# Patient Record
Sex: FEMALE | Race: WHITE | NOT HISPANIC OR LATINO
[De-identification: names, ages, dates, MRNs, and addresses within clinical notes are randomized per-mention and may not be internally consistent; named-entity substitution may affect disease eponyms.]

---

## 2023-05-17 PROBLEM — Z00.00 ENCOUNTER FOR PREVENTIVE HEALTH EXAMINATION: Status: ACTIVE | Noted: 2023-05-17

## 2023-06-08 ENCOUNTER — FORM ENCOUNTER (OUTPATIENT)
Age: 73
End: 2023-06-08

## 2023-06-14 ENCOUNTER — FORM ENCOUNTER (OUTPATIENT)
Age: 73
End: 2023-06-14

## 2023-06-14 ENCOUNTER — APPOINTMENT (OUTPATIENT)
Dept: ORTHOPEDIC SURGERY | Facility: CLINIC | Age: 73
End: 2023-06-14
Payer: MEDICARE

## 2023-06-14 DIAGNOSIS — Z86.79 PERSONAL HISTORY OF OTHER DISEASES OF THE CIRCULATORY SYSTEM: ICD-10-CM

## 2023-06-14 DIAGNOSIS — Z78.9 OTHER SPECIFIED HEALTH STATUS: ICD-10-CM

## 2023-06-14 DIAGNOSIS — I10 ESSENTIAL (PRIMARY) HYPERTENSION: ICD-10-CM

## 2023-06-14 PROCEDURE — 99204 OFFICE O/P NEW MOD 45 MIN: CPT

## 2023-06-14 RX ORDER — ONDANSETRON 4 MG/1
4 TABLET ORAL
Refills: 0 | Status: ACTIVE | COMMUNITY

## 2023-06-14 RX ORDER — ATORVASTATIN CALCIUM 20 MG/1
20 TABLET, FILM COATED ORAL
Refills: 0 | Status: ACTIVE | COMMUNITY

## 2023-06-14 RX ORDER — OLMESARTAN MEDOXOMIL 40 MG/1
40 TABLET, FILM COATED ORAL
Refills: 0 | Status: ACTIVE | COMMUNITY

## 2023-06-14 RX ORDER — PANTOPRAZOLE SODIUM 40 MG/1
40 GRANULE, DELAYED RELEASE ORAL
Refills: 0 | Status: ACTIVE | COMMUNITY

## 2023-06-14 RX ORDER — HYOSCYAMINE SULFATE 0.12 MG/1
0.12 TABLET SUBLINGUAL
Refills: 0 | Status: ACTIVE | COMMUNITY

## 2023-06-14 RX ORDER — MONTELUKAST 10 MG/1
TABLET, FILM COATED ORAL
Refills: 0 | Status: ACTIVE | COMMUNITY

## 2023-06-14 RX ORDER — APIXABAN 5 MG/1
5 TABLET, FILM COATED ORAL
Refills: 0 | Status: ACTIVE | COMMUNITY

## 2023-06-14 NOTE — DISCUSSION/SUMMARY
[de-identified] : Options were discussed today including oral anti-inflammatories, Physical Therapy, Steroid Injection, Hyalgan injections or Surgery. The patient had time to ask questions of the different treatment options, including doing nothing and just observing for a finite period of time and re-evaluating in the future.\par \par Patient can d/c the b/l bracing. She has significant poly wear to the Left knee and may need poly exchange. \par \par Patient was advised to get the OP Note from left TKA and f/u after this.

## 2023-06-14 NOTE — PHYSICAL EXAM
[de-identified] : B/L ankle aircasts in place> Aircast  LLE was removed as well as the KI.\par \par Left knee: Positive mild effusion. There is quad atrophy noted LLE: Scar noted Left knee without any signs of infection.  Patient is able to SLR against gravity. quad tendon intact.  Positive joint line TTP. ROM 0-95 with pain on extreme flexion. calf soft NT \par No laxity noted. no significant drawer

## 2023-06-14 NOTE — HISTORY OF PRESENT ILLNESS
[de-identified] : Patient is here today for her left knee.  Patient fell on her side 6 days ago and injured b/l knees and b/l ankles.  She had Left TKA by Dr Parsons approx 10-15 yrs ago.  She was seen at [facility] by Ortho and Quad tendon rupture ruled out.  She states she was placed in KI.  She has been WBAT b/l LE with LLE KI She is scheduled to see Dr Cline for b/l ankles

## 2023-06-15 ENCOUNTER — FORM ENCOUNTER (OUTPATIENT)
Age: 73
End: 2023-06-15

## 2023-06-18 ENCOUNTER — FORM ENCOUNTER (OUTPATIENT)
Age: 73
End: 2023-06-18

## 2023-06-20 ENCOUNTER — APPOINTMENT (OUTPATIENT)
Dept: ORTHOPEDIC SURGERY | Facility: CLINIC | Age: 73
End: 2023-06-20
Payer: MEDICARE

## 2023-06-20 DIAGNOSIS — S93.491A SPRAIN OF OTHER LIGAMENT OF RIGHT ANKLE, INITIAL ENCOUNTER: ICD-10-CM

## 2023-06-20 DIAGNOSIS — S93.601A UNSPECIFIED SPRAIN OF RIGHT FOOT, INITIAL ENCOUNTER: ICD-10-CM

## 2023-06-20 DIAGNOSIS — S82.61XA DISPLACED FRACTURE OF LATERAL MALLEOLUS OF RIGHT FIBULA, INITIAL ENCOUNTER FOR CLOSED FRACTURE: ICD-10-CM

## 2023-06-20 PROCEDURE — 99214 OFFICE O/P EST MOD 30 MIN: CPT

## 2023-06-20 NOTE — DATA REVIEWED
[CT Scan] : CT scan [Right] : of the right [Ankle] : ankle [I independently reviewed and interpreted images and report] : I independently reviewed and interpreted images and report [FreeTextEntry1] : IMPRESSION:\par \par Acute avulsion fracture at the anterior process of the calcaneus.\par \par Acute avulsion fracture along the dorsal lateral distal aspect of the cuboid.\par \par Acute avulsion fracture at the tip of the lateral malleolus.\par \par Ill-defined hematoma along the extensor digitorum brevis muscle.\par \par Chronic erosions seen at the metatarsal bases, cuneiforms, and medial aspect\par of the first metatarsal head. Correlate for underlying history of gout.

## 2023-06-20 NOTE — PHYSICAL EXAM
[de-identified] : Examination of the right foot and ankle is as follows:\par Inspection: swelling, ecchymosis of foot and ankle, moderate swelling of dorsal foot and lateral ankle, but no abrasion, laceration, no erythema\par Palpation: lateral malleolus tenderness, lateral ligament tenderness, tenderness to palpation over cuboid\par ROM: plantarflexion 20 degrees, inversion 15 degrees, eversion 10 degrees, dorsiflexion 10 degrees\par Strength: dorsiflexion 4/5, plantar flexion 4/5, inversion 4/5, eversion 4/5, EHL 5/5, FHL 5/5\par Vascular: dorsalis pedis pulse: 1+, posterior tibialis pulse: 1+\par Neuro: Sensation present to light touch in all distributions\par Gait: antalgic, ambulation with RW, RLE air cast\par \par X-rays of the right ankle is as follows: outside x-rays reviewed from [facility]\par Ankle 3 view: closed, nondisplaced, lateral malleolar avulsion.

## 2023-06-20 NOTE — HISTORY OF PRESENT ILLNESS
[Sudden] : sudden [Dull/Aching] : dull/aching [Throbbing] : throbbing [Intermittent] : intermittent [Household chores] : household chores [Leisure] : leisure [Social interactions] : social interactions [Rest] : rest [Retired] : Work status: retired [de-identified] : Patient is here for her right foot. Patient states her ankle gave way and she fell on 6/8/2023. Patient went to [facility] for x-ray. Patient states she has minimal pain along the anterior aspect of her foot. Patient came into office using a rolling walker and an air cast on. Patient accompanied by her .  [] : Post Surgical Visit: no [FreeTextEntry1] : Right foot [FreeTextEntry3] : 6/8/2023 [FreeTextEntry5] :  Patient states her ankle gave way and she fell  [de-identified] : Movement

## 2023-06-20 NOTE — ASSESSMENT
[FreeTextEntry1] : 71 yo female presenting with right foot sprain and avulsion, ankle sprain of lateral malleolus. Recommend non-surgical management\par -RLE WBAT in aircast. Rx short cam boot given today\par -Rx PT given\par -Activities as tolerated, avoid strenuous/impact related activities\par -Rest, ice, compression, elevation, NSAIDs PRN for pain. \par -All questions answered\par -F/u 6 weeks with repeat x-rays\par \par The diagnosis was explained in detail. The potential non-surgical and surgical treatments were reviewed. The relative risks and benefits of each option were considered relative to the patient’s age, activity level, medical history, symptom severity and previously attempted treatments.\par \par The patient was advised to consult with their primary medical provider prior to initiation of any new medications to reduce the risk of adverse effects specific to their long-term home medications and medical history. The risk of gastrointestinal irritation and kidney injury specific to long-term NSAID use was discussed.\par \par Entered by Ridge Angeles PA-C acting as scribe.\par Dr. Cline Attestation\par The documentation recorded by the scribe, in my presence, accurately reflects the service I, Dr. Cline, personally performed, and the decisions made by me with my edits as appropriate.

## 2023-06-28 ENCOUNTER — APPOINTMENT (OUTPATIENT)
Dept: ORTHOPEDIC SURGERY | Facility: CLINIC | Age: 73
End: 2023-06-28
Payer: MEDICARE

## 2023-06-28 PROCEDURE — 99214 OFFICE O/P EST MOD 30 MIN: CPT

## 2023-07-06 ENCOUNTER — FORM ENCOUNTER (OUTPATIENT)
Age: 73
End: 2023-07-06

## 2023-07-07 ENCOUNTER — INPATIENT (INPATIENT)
Facility: HOSPITAL | Age: 73
LOS: 0 days | Discharge: HOME | DRG: 274 | End: 2023-07-08
Attending: INTERNAL MEDICINE | Admitting: INTERNAL MEDICINE
Payer: COMMERCIAL

## 2023-07-07 ENCOUNTER — TRANSCRIPTION ENCOUNTER (OUTPATIENT)
Age: 73
End: 2023-07-07

## 2023-07-07 VITALS
SYSTOLIC BLOOD PRESSURE: 132 MMHG | HEART RATE: 102 BPM | DIASTOLIC BLOOD PRESSURE: 99 MMHG | RESPIRATION RATE: 16 BRPM | OXYGEN SATURATION: 96 % | HEIGHT: 66 IN | WEIGHT: 229.94 LBS

## 2023-07-07 DIAGNOSIS — I48.91 UNSPECIFIED ATRIAL FIBRILLATION: ICD-10-CM

## 2023-07-07 LAB
ABO RH CONFIRMATION: SIGNIFICANT CHANGE UP
ANION GAP SERPL CALC-SCNC: 10 MMOL/L — SIGNIFICANT CHANGE UP (ref 5–17)
APTT BLD: 33.1 SEC — SIGNIFICANT CHANGE UP (ref 27.5–35.5)
BLD GP AB SCN SERPL QL: SIGNIFICANT CHANGE UP
BUN SERPL-MCNC: 13.3 MG/DL — SIGNIFICANT CHANGE UP (ref 8–20)
CALCIUM SERPL-MCNC: 9.3 MG/DL — SIGNIFICANT CHANGE UP (ref 8.4–10.5)
CHLORIDE SERPL-SCNC: 106 MMOL/L — SIGNIFICANT CHANGE UP (ref 96–108)
CO2 SERPL-SCNC: 26 MMOL/L — SIGNIFICANT CHANGE UP (ref 22–29)
CREAT SERPL-MCNC: 0.59 MG/DL — SIGNIFICANT CHANGE UP (ref 0.5–1.3)
EGFR: 96 ML/MIN/1.73M2 — SIGNIFICANT CHANGE UP
GLUCOSE SERPL-MCNC: 104 MG/DL — HIGH (ref 70–99)
HCT VFR BLD CALC: 38.7 % — SIGNIFICANT CHANGE UP (ref 34.5–45)
HGB BLD-MCNC: 12.6 G/DL — SIGNIFICANT CHANGE UP (ref 11.5–15.5)
INR BLD: 1.2 RATIO — HIGH (ref 0.88–1.16)
MAGNESIUM SERPL-MCNC: 2.2 MG/DL — SIGNIFICANT CHANGE UP (ref 1.6–2.6)
MCHC RBC-ENTMCNC: 28.6 PG — SIGNIFICANT CHANGE UP (ref 27–34)
MCHC RBC-ENTMCNC: 32.6 GM/DL — SIGNIFICANT CHANGE UP (ref 32–36)
MCV RBC AUTO: 88 FL — SIGNIFICANT CHANGE UP (ref 80–100)
PLATELET # BLD AUTO: 222 K/UL — SIGNIFICANT CHANGE UP (ref 150–400)
POTASSIUM SERPL-MCNC: 4.1 MMOL/L — SIGNIFICANT CHANGE UP (ref 3.5–5.3)
POTASSIUM SERPL-SCNC: 4.1 MMOL/L — SIGNIFICANT CHANGE UP (ref 3.5–5.3)
PROTHROM AB SERPL-ACNC: 14 SEC — HIGH (ref 10.5–13.4)
RBC # BLD: 4.4 M/UL — SIGNIFICANT CHANGE UP (ref 3.8–5.2)
RBC # FLD: 14 % — SIGNIFICANT CHANGE UP (ref 10.3–14.5)
SODIUM SERPL-SCNC: 142 MMOL/L — SIGNIFICANT CHANGE UP (ref 135–145)
WBC # BLD: 6.83 K/UL — SIGNIFICANT CHANGE UP (ref 3.8–10.5)
WBC # FLD AUTO: 6.83 K/UL — SIGNIFICANT CHANGE UP (ref 3.8–10.5)

## 2023-07-07 PROCEDURE — 93010 ELECTROCARDIOGRAM REPORT: CPT | Mod: 77

## 2023-07-07 PROCEDURE — 93623 PRGRMD STIMJ&PACG IV RX NFS: CPT | Mod: 26

## 2023-07-07 PROCEDURE — 92960 CARDIOVERSION ELECTRIC EXT: CPT | Mod: 59

## 2023-07-07 PROCEDURE — 93622 COMP EP EVAL L VENTR PAC&REC: CPT | Mod: 26

## 2023-07-07 PROCEDURE — 93656 COMPRE EP EVAL ABLTJ ATR FIB: CPT

## 2023-07-07 PROCEDURE — 93655 ICAR CATH ABLTJ DSCRT ARRHYT: CPT

## 2023-07-07 RX ORDER — FUROSEMIDE 40 MG
20 TABLET ORAL ONCE
Refills: 0 | Status: DISCONTINUED | OUTPATIENT
Start: 2023-07-07 | End: 2023-07-07

## 2023-07-07 RX ORDER — AMLODIPINE BESYLATE 2.5 MG/1
5 TABLET ORAL DAILY
Refills: 0 | Status: DISCONTINUED | OUTPATIENT
Start: 2023-07-07 | End: 2023-07-08

## 2023-07-07 RX ORDER — LOSARTAN POTASSIUM 100 MG/1
100 TABLET, FILM COATED ORAL DAILY
Refills: 0 | Status: DISCONTINUED | OUTPATIENT
Start: 2023-07-08 | End: 2023-07-08

## 2023-07-07 RX ORDER — FUROSEMIDE 40 MG
40 TABLET ORAL ONCE
Refills: 0 | Status: COMPLETED | OUTPATIENT
Start: 2023-07-08 | End: 2023-07-08

## 2023-07-07 RX ORDER — APIXABAN 2.5 MG/1
5 TABLET, FILM COATED ORAL
Refills: 0 | Status: DISCONTINUED | OUTPATIENT
Start: 2023-07-07 | End: 2023-07-08

## 2023-07-07 RX ORDER — PANTOPRAZOLE SODIUM 20 MG/1
40 TABLET, DELAYED RELEASE ORAL
Refills: 0 | Status: DISCONTINUED | OUTPATIENT
Start: 2023-07-07 | End: 2023-07-08

## 2023-07-07 RX ORDER — ALPRAZOLAM 0.25 MG
0.25 TABLET ORAL EVERY 8 HOURS
Refills: 0 | Status: DISCONTINUED | OUTPATIENT
Start: 2023-07-07 | End: 2023-07-08

## 2023-07-07 RX ORDER — NEBIVOLOL HYDROCHLORIDE 5 MG/1
10 TABLET ORAL DAILY
Refills: 0 | Status: DISCONTINUED | OUTPATIENT
Start: 2023-07-08 | End: 2023-07-08

## 2023-07-07 RX ORDER — HYDROCORTISONE 20 MG
100 TABLET ORAL ONCE
Refills: 0 | Status: DISCONTINUED | OUTPATIENT
Start: 2023-07-07 | End: 2023-07-07

## 2023-07-07 RX ORDER — BENZOCAINE AND MENTHOL 5; 1 G/100ML; G/100ML
1 LIQUID ORAL
Refills: 0 | Status: DISCONTINUED | OUTPATIENT
Start: 2023-07-07 | End: 2023-07-08

## 2023-07-07 RX ORDER — FUROSEMIDE 40 MG
40 TABLET ORAL ONCE
Refills: 0 | Status: COMPLETED | OUTPATIENT
Start: 2023-07-07 | End: 2023-07-07

## 2023-07-07 RX ORDER — HYDROCORTISONE 20 MG
120 TABLET ORAL ONCE
Refills: 0 | Status: COMPLETED | OUTPATIENT
Start: 2023-07-07 | End: 2023-07-07

## 2023-07-07 RX ORDER — COLCHICINE 0.6 MG
0.6 TABLET ORAL DAILY
Refills: 0 | Status: DISCONTINUED | OUTPATIENT
Start: 2023-07-07 | End: 2023-07-08

## 2023-07-07 RX ORDER — ATORVASTATIN CALCIUM 80 MG/1
20 TABLET, FILM COATED ORAL AT BEDTIME
Refills: 0 | Status: DISCONTINUED | OUTPATIENT
Start: 2023-07-07 | End: 2023-07-08

## 2023-07-07 RX ORDER — FUROSEMIDE 40 MG
20 TABLET ORAL DAILY
Refills: 0 | Status: CANCELLED | OUTPATIENT
Start: 2023-07-09 | End: 2023-07-08

## 2023-07-07 RX ORDER — ONDANSETRON 8 MG/1
4 TABLET, FILM COATED ORAL EVERY 8 HOURS
Refills: 0 | Status: DISCONTINUED | OUTPATIENT
Start: 2023-07-07 | End: 2023-07-08

## 2023-07-07 RX ORDER — SUCRALFATE 1 G
1 TABLET ORAL
Refills: 0 | Status: DISCONTINUED | OUTPATIENT
Start: 2023-07-07 | End: 2023-07-08

## 2023-07-07 RX ORDER — MONTELUKAST 4 MG/1
10 TABLET, CHEWABLE ORAL DAILY
Refills: 0 | Status: DISCONTINUED | OUTPATIENT
Start: 2023-07-07 | End: 2023-07-08

## 2023-07-07 RX ADMIN — ATORVASTATIN CALCIUM 20 MILLIGRAM(S): 80 TABLET, FILM COATED ORAL at 22:34

## 2023-07-07 RX ADMIN — PANTOPRAZOLE SODIUM 40 MILLIGRAM(S): 20 TABLET, DELAYED RELEASE ORAL at 22:34

## 2023-07-07 RX ADMIN — Medication 120 MILLIGRAM(S): at 19:51

## 2023-07-07 RX ADMIN — Medication 1 GRAM(S): at 22:35

## 2023-07-07 RX ADMIN — APIXABAN 5 MILLIGRAM(S): 2.5 TABLET, FILM COATED ORAL at 22:34

## 2023-07-07 RX ADMIN — Medication 0.6 MILLIGRAM(S): at 22:36

## 2023-07-07 RX ADMIN — BENZOCAINE AND MENTHOL 1 LOZENGE: 5; 1 LIQUID ORAL at 22:36

## 2023-07-07 RX ADMIN — ONDANSETRON 4 MILLIGRAM(S): 8 TABLET, FILM COATED ORAL at 21:09

## 2023-07-07 RX ADMIN — Medication 40 MILLIGRAM(S): at 22:36

## 2023-07-07 NOTE — H&P PST ADULT - ASSESSMENT
71 yo female with PMH of HTN, HLD, GERD, OA s/p bilateral TKR, BILL and persistent AF (on Eliquis). She was initially diagnosed with AF in 9/2022. She developed palpitations, CLOUD, and peripheral edema. She saw her cardiologist (Dr. Ramsey) and was noted to be in AF. She had an echo and stress test at that time which were unremarkable. She was started on Lasix, Eliquis and Metoprolol. She was suggested to undergo a DC cardioversion but preferred to continue to rate control strategies However, her symptoms worsened and she did undergo a DCCV in 1/2023. After cardioversion she reported feeling extremely better One day in May, she was walking and all of a sudden she could not breath. She saw Dr. Purcell and was noted to be back in AF. She underwent a sleep study which was positive for BILL. She now presents electively for SUNI and RFA of atrial fibrillation.     Plan:  Consent w/ Attending  Stat labs & ecg  NPO ****  Last Eliquis dose **** 71 yo female with PMH of HTN, HLD, GERD, OA s/p bilateral TKR, BILL and persistent AF (on Eliquis). She was initially diagnosed with AF in 9/2022. She developed palpitations, CLOUD, and peripheral edema. She saw her cardiologist (Dr. Ramsey) and was noted to be in AF. She had an echo and stress test at that time which were unremarkable. She was started on Lasix, Eliquis and Metoprolol. She was suggested to undergo a DC cardioversion but preferred to continue to rate control strategies However, her symptoms worsened and she did undergo a DCCV in 1/2023. After cardioversion she reported feeling extremely better One day in May, she was walking and all of a sudden she could not breath. She saw Dr. Purcell and was noted to be back in AF. She underwent a sleep study which was positive for BILL. She now presents electively for SUNI and RFA of atrial fibrillation.     Plan:  Consent w/ Attending  Stat labs & ecg  NPO confirmed   Last Eliquis dose yesterday evening 19:00 71 yo female with PMH of HTN, HLD, GERD, OA s/p bilateral TKR, BILL and persistent AF (on Eliquis). She was initially diagnosed with AF in 9/2022. She developed palpitations, CLOUD, and peripheral edema. She saw her cardiologist (Dr. Ramsey) and was noted to be in AF. She had an echo and stress test at that time which were unremarkable. She was started on Lasix, Eliquis and Metoprolol. She was suggested to undergo a DC cardioversion but preferred to continue to rate control strategies However, her symptoms worsened and she did undergo a DCCV in 1/2023. After cardioversion she reported feeling extremely better One day in May, she was walking and all of a sudden she could not breath. She saw Dr. Purcell and was noted to be back in AF. She underwent a sleep study which was positive for BILL. She now presents electively for SUNI and RFA of atrial fibrillation.     Of note, patient sustained a fall 1 month ago sustaining injuries to right ankle and left leg. She is currently in an aircast to the right and ambulating with a cane. She is scheduled to have knee/leg surgery in September. She was made aware that she can NOT interrupt her anticoagulation for at least 1 month following her procedure.     Plan:  Consent w/ Attending  Stat labs & ecg  NPO confirmed   Last Eliquis dose yesterday evening 19:00 73 yo female with PMH of HTN, HLD, GERD, OA s/p bilateral TKR, BILL and persistent AF (on Eliquis). She was initially diagnosed with AF in 9/2022. She developed palpitations, CLOUD, and peripheral edema. She saw her cardiologist (Dr. Ramsey) and was noted to be in AF. She had an echo and stress test at that time which were unremarkable. She was started on Lasix, Eliquis and BB. She was suggested to undergo a DC cardioversion but preferred to continue to rate control strategies However, her symptoms worsened and she did undergo a DCCV in 1/2023. After cardioversion she reported feeling extremely better One day in May, she was walking and all of a sudden she could not breath. She saw Dr. Purcell and was noted to be back in AF. She underwent a sleep study which was positive for BILL. She now presents electively for SUNI and RFA of atrial fibrillation.     Of note, patient sustained a fall 1 month ago sustaining injuries to right ankle and left leg. She is currently in an aircast to the right and ambulating with a cane. She is scheduled to have knee/leg surgery in September. She was made aware that she can NOT interrupt her anticoagulation for at least 1 month following her procedure.     Plan:  Consent w/ Attending  Stat labs & ecg  NPO confirmed   Last Eliquis dose yesterday evening 19:00

## 2023-07-07 NOTE — H&P PST ADULT - COMMENTS
Denies fevers, chills, CP, dizziness, syncope, abdominal pain, N/V/D, hematuria, bloody and/or dark stools     + palpitation +CLOUD

## 2023-07-07 NOTE — ASU PATIENT PROFILE, ADULT - FALL HARM RISK - HARM RISK INTERVENTIONS

## 2023-07-07 NOTE — H&P PST ADULT - ATTENDING COMMENTS
All risks, benefits and alternatives discussed with patient. She agrees to proceed.    Ricki Moreno MD  Clinical Cardiac Electrophysiology

## 2023-07-07 NOTE — H&P PST ADULT - NSICDXPASTSURGICALHX_GEN_ALL_CORE_FT
PAST SURGICAL HISTORY:  Herniated Disc- lumbar     History of Breast Lump/Mass Excision right    History of Total Hysterectomy with Removal of Both Tubes and Ovaries     S/P Arthroscopy of Right Knee     S/P Carpal Tunnel Release right    S/P Carpal Tunnel Release left    S/P Shoulder Surgery     Trigger finger release- left

## 2023-07-07 NOTE — DISCHARGE NOTE PROVIDER - NSDCCPTREATMENT_GEN_ALL_CORE_FT
PRINCIPAL PROCEDURE  Procedure: Intracardiac catheter ablation for atrial fibrillation  Findings and Treatment: - Bruising at the groin, sometimes extending down the leg, and/or a small lump under the skin at the groin access site is normal and will resolve within 2 – 3 weeks.   - Occasional skipped beats or palpitations that last for a few beats are common and generally resolve within 1-2 months.   - You may walk and take stairs at a regular pace.   - Do not perform any exercise more strenuous than walking for 1 week.   - Do not strain or lift heavy objects for 1 week.  - You may shower the day after the procedure.  - Do not soak in water (such as tub baths, hot tubs, swimming, etc.) for 1 week.   - You may resume all other activities the day after the procedure.  Call your doctor if:   - you notice bleeding, redness, drainage, swelling, increased tenderness or a hot sensation around the catheter insertion site.   - your temperature is greater than 100 degrees F for more than 24 hours.  - your rapid heart rhythm returns.  - you have any questions or concerns regarding the procedure.  If significant bleeding and/or a large lump (the size of a golf ball or bigger) occurs:  - Lie flat and apply continuous direct pressure just above the puncture site for at least 10 minutes  - If the issue resolves, notify your physician immediately.    - If the bleeding cannot be controlled, please seek immediate medical attention.  If you experience increased difficulty breathing or chest pain, or if you faint or have dizzy spells, please seek immediate medical attention.

## 2023-07-07 NOTE — DISCHARGE NOTE PROVIDER - NSDCFUADDAPPT_GEN_ALL_CORE_FT
Our office will contact you in 3-5 days to schedule this appointment. Please call [phone] with questions or concerns.

## 2023-07-07 NOTE — H&P PST ADULT - HISTORY OF PRESENT ILLNESS
73 yo female with PMH of HTN, HLD, GERD, OA s/p bilateral TKR, BILL and persistent AF (on Eliquis). She was initially diagnosed with AF in 2022. She developed palpitations, CLOUD, and peripheral edema. She saw her cardiologist (Dr. Ramsey) and was noted to be in AF. She had an echo and stress test at that time which were unremarkable. She was started on Lasix, Eliquis and Metoprolol. She was suggested to undergo a DC cardioversion but preferred to continue to rate control strategies However, her symptoms worsened and she did undergo a DCCV in 2023. After cardioversion she reported feeling extremely better One day in May, she was walking and all of a sudden she could not breath. She saw Dr. Purcell and was noted to be back in AF. She underwent a sleep study which was positive for BILL. She now presents electively for SUNI and RFA of atrial fibrillation.     Cardiology summary:  EK2023 AF rate controlled, at 83bpm. Nonspecific ST changes           5/10/2023 AF at 81bpm          2023 SB at 54bpm w/ APCs and nonspecific ST changes   Rhythm monitorin/5-2022: 100% AF burden with average HR 95bpm (56-152bpm)  Echo: 1/3/2023 (SUNI) LVEF normal. No RADHA thrombus. No RA thrombus. No significant valvular dysfunction. Mild atheroma in ascending & descending aorta           2022 (TTE) LVEF 56%. Mild concentric LVH. Moderately dilated LA. RA moderately dilated. Mild AI, MR, TR.  Stress: 3/30/2022 (NST) LVEF 73%. Abnormal perfusion imaging with small size defect of mild intensity with partial improvement in the basal to mid anterior walls.  LHC: 5/10/2022 mLAD 40% stenosis w/ a myocardial bridge and severely tortuous coronaries. Luminal irregularities.   71 yo female with PMH of HTN, HLD, GERD, OA s/p bilateral TKR, BILL and persistent AF (on Eliquis). She was initially diagnosed with AF in 2022. She developed palpitations, CLOUD, and peripheral edema. She saw her cardiologist (Dr. Ramsey) and was noted to be in AF. She had an echo and stress test at that time which were unremarkable. She was started on Lasix, Eliquis and Metoprolol. She was suggested to undergo a DC cardioversion but preferred to continue to rate control strategies However, her symptoms worsened and she did undergo a DCCV in 2023. After cardioversion she reported feeling extremely better One day in May, she was walking and all of a sudden she could not breath. She saw Dr. Purcell and was noted to be back in AF. She underwent a sleep study which was positive for BILL. She now presents electively for SUNI and RFA of atrial fibrillation.     Of note, patient sustained a fall 1 month ago sustaining injuries to b/l ankles. She is currently in an aircast to the left and ambulating with a cane.     Cardiology summary:  EK2023 AF rate controlled, at 83bpm. Nonspecific ST changes           5/10/2023 AF at 81bpm          2023 SB at 54bpm w/ APCs and nonspecific ST changes   Rhythm monitorin/5-2022: 100% AF burden with average HR 95bpm (56-152bpm)  Echo: 1/3/2023 (SUNI) LVEF normal. No RADHA thrombus. No RA thrombus. No significant valvular dysfunction. Mild atheroma in ascending & descending aorta           2022 (TTE) LVEF 56%. Mild concentric LVH. Moderately dilated LA. RA moderately dilated. Mild AI, MR, TR.  Stress: 3/30/2022 (NST) LVEF 73%. Abnormal perfusion imaging with small size defect of mild intensity with partial improvement in the basal to mid anterior walls.  LHC: 5/10/2022 mLAD 40% stenosis w/ a myocardial bridge and severely tortuous coronaries. Luminal irregularities.   71 yo female with PMH of HTN, HLD, GERD, OA s/p bilateral TKR, BILL and persistent AF (on Eliquis). She was initially diagnosed with AF in 2022. She developed palpitations, CLOUD, and peripheral edema. She saw her cardiologist (Dr. Ramsey) and was noted to be in AF. She had an echo and stress test at that time which were unremarkable. She was started on Lasix, Eliquis and Metoprolol. She was suggested to undergo a DC cardioversion but preferred to continue to rate control strategies However, her symptoms worsened and she did undergo a DCCV in 2023. After cardioversion she reported feeling extremely better One day in May, she was walking and all of a sudden she could not breath. She saw Dr. Purcell and was noted to be back in AF. She underwent a sleep study which was positive for BILL. She now presents electively for SUNI and RFA of atrial fibrillation.     Of note, patient sustained a fall 1 month ago sustaining injuries to right ankle and left leg. She is currently in an aircast to the right and ambulating with a cane. She is scheduled to have knee/leg surgery in September. She was made aware that she can NOT interrupt her anticoagulation for at least 1 month following her procedure.     Cardiology summary:  EK2023 AF rate controlled, at 83bpm. Nonspecific ST changes           5/10/2023 AF at 81bpm          2023 SB at 54bpm w/ APCs and nonspecific ST changes   Rhythm monitorin/5-2022: 100% AF burden with average HR 95bpm (56-152bpm)  Echo: 1/3/2023 (SUNI) LVEF normal. No RADHA thrombus. No RA thrombus. No significant valvular dysfunction. Mild atheroma in ascending & descending aorta           2022 (TTE) LVEF 56%. Mild concentric LVH. Moderately dilated LA. RA moderately dilated. Mild AI, MR, TR.  Stress: 3/30/2022 (NST) LVEF 73%. Abnormal perfusion imaging with small size defect of mild intensity with partial improvement in the basal to mid anterior walls.  LHC: 5/10/2022 mLAD 40% stenosis w/ a myocardial bridge and severely tortuous coronaries. Luminal irregularities.   71 yo female with PMH of HTN, HLD, GERD, OA s/p bilateral TKR, BILL and persistent AF (on Eliquis). She was initially diagnosed with AF in 2022. She developed palpitations, CLOUD, and peripheral edema. She saw her cardiologist (Dr. Ramsey) and was noted to be in AF. She had an echo and stress test at that time which were unremarkable. She was started on Lasix, Eliquis and BB. She was suggested to undergo a DC cardioversion but preferred to continue to rate control strategies However, her symptoms worsened and she did undergo a DCCV in 2023. After cardioversion she reported feeling extremely better One day in May, she was walking and all of a sudden she could not breath. She saw Dr. Purcell and was noted to be back in AF. She underwent a sleep study which was positive for BILL. She now presents electively for SUNI and RFA of atrial fibrillation.     Of note, patient sustained a fall 1 month ago sustaining injuries to right ankle and left leg. She is currently in an aircast to the right and ambulating with a cane. She is scheduled to have knee/leg surgery in September. She was made aware that she can NOT interrupt her anticoagulation for at least 1 month following her procedure.     Cardiology summary:  EK2023 AF rate controlled, at 83bpm. Nonspecific ST changes           5/10/2023 AF at 81bpm          2023 SB at 54bpm w/ APCs and nonspecific ST changes   Rhythm monitorin/5-2022: 100% AF burden with average HR 95bpm (56-152bpm)  Echo: 1/3/2023 (SUNI) LVEF normal. No RADHA thrombus. No RA thrombus. No significant valvular dysfunction. Mild atheroma in ascending & descending aorta           2022 (TTE) LVEF 56%. Mild concentric LVH. Moderately dilated LA. RA moderately dilated. Mild AI, MR, TR.  Stress: 3/30/2022 (NST) LVEF 73%. Abnormal perfusion imaging with small size defect of mild intensity with partial improvement in the basal to mid anterior walls.  LHC: 5/10/2022 mLAD 40% stenosis w/ a myocardial bridge and severely tortuous coronaries. Luminal irregularities.

## 2023-07-07 NOTE — DISCHARGE NOTE PROVIDER - CARE PROVIDER_API CALL
Deandre Ramsey  Cardiovascular Disease  [address] [place] [address]  Phone: [phone]  Fax: [phone]  Follow Up Time:     Ricki Moreno  Cardiac Electrophysiology  [address] [facility], [address]  [place] [address]  Phone: [phone]  Fax: [phone]  Follow Up Time:

## 2023-07-07 NOTE — DISCHARGE NOTE PROVIDER - HOSPITAL COURSE
71 yo female with PMH of HTN, HLD, GERD, OA s/p bilateral TKR, BILL and persistent AF (on Eliquis). She was initially diagnosed with AF in 9/2022. She developed palpitations, CLOUD, and peripheral edema. She saw her cardiologist (Dr. Ramsey) and was noted to be in AF. She had an echo and stress test at that time which were unremarkable. She was started on Lasix, Eliquis and BB. She was suggested to undergo a DC cardioversion but preferred to continue to rate control strategies However, her symptoms worsened and she did undergo a DCCV in 1/2023. After cardioversion she reported feeling extremely better One day in May, she was walking and all of a sudden she could not breath. She saw Dr. Purcell and was noted to be back in AF. She underwent a sleep study which was positive for BILL. She presented electively on 7/7/2023 for and is now status post uncomplicated radiofrequency ablation of atrial fibrillation.

## 2023-07-07 NOTE — PROGRESS NOTE ADULT - SUBJECTIVE AND OBJECTIVE BOX
PROCEDURE(S): Radiofrequency Ablation of Atrial Fibrillation    ELECTROPHYSIOLOGIST(S): Ricki Moreno MD         COMPLICATIONS:  none        DISPOSITION:  observation     CONDITION: stable  EBL: <15mL    Pt doing well s/p atrial fibrillation ablation (WACA/PVI, roof line and 2 anterior mitral lines) via b/l FV access. Denies complaint.     SUNI:   Summary:   1. Left ventricular ejection fraction, by visual estimation, is 55 to   60%.   2. Normal global left ventricular systolic function.   3. The mitral in-flow pattern reveals no discernable A-wave, therefore   no comment on diastolic function can be made.   4. Normal right ventricular size and function, inadequate estimation of   RVSP.   5. Moderately enlarged left atrium.   6. No left atrial appendage thrombus, left atrial appendage enlargement   and normal left atrial appendage velocities.   7. Mildly enlarged right atrium.   8. Trace mitral valve regurgitation.   9. Mild aortic regurgitation.  10. Mild simple atheroma at the aortic arch.  11. There is no evidence of pericardial effusion.    Ray Giron DO Electronically signed on 7/7/2023 at 1:44:02 PM      MEDICATIONS  (STANDING):  amLODIPine   Tablet 5 milliGRAM(s) Oral daily  apixaban 5 milliGRAM(s) Oral <User Schedule>  atorvastatin 20 milliGRAM(s) Oral at bedtime  colchicine 0.6 milliGRAM(s) Oral daily  furosemide   Injectable 20 milliGRAM(s) IV Push once  hydrocortisone sodium succinate Injectable 120 milliGRAM(s) IV Push once  montelukast 10 milliGRAM(s) Oral daily  pantoprazole    Tablet 40 milliGRAM(s) Oral two times a day  sucralfate suspension 1 Gram(s) Oral two times a day    MEDICATIONS  (PRN):  ALPRAZolam 0.25 milliGRAM(s) Oral every 8 hours PRN anxiety/ insomnia  aluminum hydroxide/magnesium hydroxide/simethicone Suspension 30 milliLiter(s) Oral every 4 hours PRN Dyspepsia  benzocaine/menthol Lozenge 1 Lozenge Oral every 2 hours PRN Sore Throat  ondansetron Injectable 4 milliGRAM(s) IV Push every 8 hours PRN Nausea and/or Vomiting      Allergies  Metoprolol Succinate ER (Other)  Percocet 10/325 (Nausea)      Exam:   HR:   BP:   RR:   SpO2:     Gen: VSS, NAD, A&O x 3  Card: S1/S2, RRR, no m/g/r  Resp: lungs CTA b/l  Abd: S/NT/ND  Groins: hemostatic sutures in place; sites C/D/I b/l; no bleeding, hematoma, erythema, exudate or edema  Ext: no edema; distal pulses intact. Aircast to the right ankle  Neuro: CN II-XII grossly intact, JACKSON x4 with strength and sensation intact and equal bilaterally    I/Os: net +     ECG:    Assessment:   73 yo female with PMH of HTN, HLD, GERD, OA s/p bilateral TKR, BILL and persistent AF (on Eliquis). She was initially diagnosed with AF in 9/2022. She developed palpitations, CLOUD, and peripheral edema. She saw her cardiologist (Dr. Ramsey) and was noted to be in AF. She had an echo and stress test at that time which were unremarkable. She was started on Lasix, Eliquis and BB. She was suggested to undergo a DC cardioversion but preferred to continue to rate control strategies However, her symptoms worsened and she did undergo a DCCV in 1/2023. After cardioversion she reported feeling extremely better One day in May, she was walking and all of a sudden she could not breath. She saw Dr. Purcell and was noted to be back in AF. She underwent a sleep study which was positive for BILL. She presented electively on 7/7/2023 for and is now status post uncomplicated radiofrequency ablation of atrial fibrillation.      Plan:   Bedrest x 4 hours, then OOB with assistance and progress as tolerated.   Groin sutures to be removed by EP service in AM.   Radial art line to be removed once pt fully awake with stable vitals >1 hour post op.   Pending groin status: 5mg PO Eliquis to resume at 22:30 tonight.   DO NOT HOLD, INTERRUPT OR REVERSE ANTICOAGULATION WITHOUT EXPLICIT APPROVAL FROM EP SERVICE.   Lasix 20mg IV x 1 dose once ambulating, then Lasix 20mg PO daily x 3 days.   Solu-Cortef 120mg IV once.  Colchicine 0.6mg once daily x 14 days.   Start Protonix 40mg twice daily x 2 weeks, then resume usual once daily dosing.   Start Carafate 1gm BID x 2 weeks.   Continue other home medications.   Strict I/Os.  Please encourage incentive spirometry and ambulation once able.  Observation and monitoring on telemetry overnight with anticipated discharge in the AM and outpt follow up in 2-4 weeks.  PROCEDURE(S): Radiofrequency Ablation of Atrial Fibrillation    ELECTROPHYSIOLOGIST(S): Ricki Moreno MD         COMPLICATIONS:  none        DISPOSITION:  observation     CONDITION: stable  EBL: <15mL    Pt doing well s/p atrial fibrillation ablation (WACA/PVI, roof line and 2 anterior mitral lines) via b/l FV access. CV x 4(360J x 2 and 200J x 2). Denies complaint.     SUNI:   Summary:   1. Left ventricular ejection fraction, by visual estimation, is 55 to   60%.   2. Normal global left ventricular systolic function.   3. The mitral in-flow pattern reveals no discernable A-wave, therefore   no comment on diastolic function can be made.   4. Normal right ventricular size and function, inadequate estimation of   RVSP.   5. Moderately enlarged left atrium.   6. No left atrial appendage thrombus, left atrial appendage enlargement   and normal left atrial appendage velocities.   7. Mildly enlarged right atrium.   8. Trace mitral valve regurgitation.   9. Mild aortic regurgitation.  10. Mild simple atheroma at the aortic arch.  11. There is no evidence of pericardial effusion.    Ray Giron DO Electronically signed on 7/7/2023 at 1:44:02 PM      MEDICATIONS  (STANDING):  amLODIPine   Tablet 5 milliGRAM(s) Oral daily  apixaban 5 milliGRAM(s) Oral <User Schedule>  atorvastatin 20 milliGRAM(s) Oral at bedtime  colchicine 0.6 milliGRAM(s) Oral daily  furosemide   Injectable 20 milliGRAM(s) IV Push once  hydrocortisone sodium succinate Injectable 120 milliGRAM(s) IV Push once  montelukast 10 milliGRAM(s) Oral daily  pantoprazole    Tablet 40 milliGRAM(s) Oral two times a day  sucralfate suspension 1 Gram(s) Oral two times a day    MEDICATIONS  (PRN):  ALPRAZolam 0.25 milliGRAM(s) Oral every 8 hours PRN anxiety/ insomnia  aluminum hydroxide/magnesium hydroxide/simethicone Suspension 30 milliLiter(s) Oral every 4 hours PRN Dyspepsia  benzocaine/menthol Lozenge 1 Lozenge Oral every 2 hours PRN Sore Throat  ondansetron Injectable 4 milliGRAM(s) IV Push every 8 hours PRN Nausea and/or Vomiting      Allergies  Metoprolol Succinate ER (Other)  Percocet 10/325 (Nausea)      Exam:   HR: 70  BP: 120/70  RR: 16  SpO2: 93% NRB    Gen: VSS, NAD, Awake and alert  Card: S1/S2, RRR, no m/g/r  Resp: lungs CTA b/l  Abd: Obese, S/NT/ND  Groins: hemostatic sutures in place; sites C/D/I b/l; no bleeding, hematoma, erythema, exudate or edema  Ext: no edema; distal pulses intact. Aircast to the right ankle  Neuro: CN II-XII grossly intact, JACKSON x4 with strength and sensation intact and equal bilaterally    I/Os: net + 3583    ECG:    Assessment:   73 yo female with PMH of HTN, HLD, GERD, OA s/p bilateral TKR, BILL and persistent AF (on Eliquis). She was initially diagnosed with AF in 9/2022. She developed palpitations, CLOUD, and peripheral edema. She saw her cardiologist (Dr. Ramsey) and was noted to be in AF. She had an echo and stress test at that time which were unremarkable. She was started on Lasix, Eliquis and BB. She was suggested to undergo a DC cardioversion but preferred to continue to rate control strategies However, her symptoms worsened and she did undergo a DCCV in 1/2023. After cardioversion she reported feeling extremely better One day in May, she was walking and all of a sudden she could not breath. She saw Dr. Purcell and was noted to be back in AF. She underwent a sleep study which was positive for BILL. She presented electively on 7/7/2023 for and is now status post uncomplicated radiofrequency ablation of atrial fibrillation.      Plan:   Bedrest x 4 hours, then OOB with assistance and progress as tolerated.   Groin sutures to be removed by EP service in AM.   Pending groin status: 5mg PO Eliquis to resume at 22:30 tonight.   DO NOT HOLD, INTERRUPT OR REVERSE ANTICOAGULATION WITHOUT EXPLICIT APPROVAL FROM EP SERVICE.   Lasix 40mg IV x 1 dose once ambulating and again in the AM then Lasix 20mg PO daily x 2 more days.   Solu-Cortef 120mg IV once.  Colchicine 0.6mg once daily x 14 days.   Start Protonix 40mg twice daily x 2 weeks, then resume usual once daily dosing.   Start Carafate 1gm BID x 2 weeks.   Continue other home medications.   Strict I/Os.  Please encourage incentive spirometry and ambulation once able.  Observation and monitoring on telemetry overnight with anticipated discharge in the AM and outpt follow up in 2-4 weeks.  PROCEDURE(S): Radiofrequency Ablation of Atrial Fibrillation    ELECTROPHYSIOLOGIST(S): Ricki Moreno MD         COMPLICATIONS:  none        DISPOSITION:  observation     CONDITION: stable  EBL: <15mL    Pt doing well s/p atrial fibrillation ablation (WACA/PVI, roof line and 2 anterior mitral lines) via b/l FV access. CV x 4(360J x 2 and 200J x 2). Denies complaint.     SUNI:   Summary:   1. Left ventricular ejection fraction, by visual estimation, is 55 to   60%.   2. Normal global left ventricular systolic function.   3. The mitral in-flow pattern reveals no discernable A-wave, therefore   no comment on diastolic function can be made.   4. Normal right ventricular size and function, inadequate estimation of   RVSP.   5. Moderately enlarged left atrium.   6. No left atrial appendage thrombus, left atrial appendage enlargement   and normal left atrial appendage velocities.   7. Mildly enlarged right atrium.   8. Trace mitral valve regurgitation.   9. Mild aortic regurgitation.  10. Mild simple atheroma at the aortic arch.  11. There is no evidence of pericardial effusion.    Ray Giron DO Electronically signed on 7/7/2023 at 1:44:02 PM      MEDICATIONS  (STANDING):  amLODIPine   Tablet 5 milliGRAM(s) Oral daily  apixaban 5 milliGRAM(s) Oral <User Schedule>  atorvastatin 20 milliGRAM(s) Oral at bedtime  colchicine 0.6 milliGRAM(s) Oral daily  furosemide   Injectable 20 milliGRAM(s) IV Push once  hydrocortisone sodium succinate Injectable 120 milliGRAM(s) IV Push once  montelukast 10 milliGRAM(s) Oral daily  pantoprazole    Tablet 40 milliGRAM(s) Oral two times a day  sucralfate suspension 1 Gram(s) Oral two times a day    MEDICATIONS  (PRN):  ALPRAZolam 0.25 milliGRAM(s) Oral every 8 hours PRN anxiety/ insomnia  aluminum hydroxide/magnesium hydroxide/simethicone Suspension 30 milliLiter(s) Oral every 4 hours PRN Dyspepsia  benzocaine/menthol Lozenge 1 Lozenge Oral every 2 hours PRN Sore Throat  ondansetron Injectable 4 milliGRAM(s) IV Push every 8 hours PRN Nausea and/or Vomiting      Allergies  Metoprolol Succinate ER (Other)  Percocet 10/325 (Nausea)      Exam:   HR: 70  BP: 120/70  RR: 16  SpO2: 93% NRB    Gen: VSS, NAD, Awake and alert  Card: S1/S2, RRR, no m/g/r  Resp: lungs CTA b/l  Abd: Obese, S/NT/ND  Groins: hemostatic sutures in place; sites C/D/I b/l; no bleeding, hematoma, erythema, exudate or edema  Ext: no edema; distal pulses intact. Aircast to the right ankle  Neuro: CN II-XII grossly intact, JACKSON x4 with strength and sensation intact and equal bilaterally    I/Os: net + 3583    ECG:    Assessment:   71 yo female with PMH of HTN, HLD, GERD, OA s/p bilateral TKR, BILL and persistent AF (on Eliquis). She was initially diagnosed with AF in 9/2022. She developed palpitations, CLOUD, and peripheral edema. She saw her cardiologist (Dr. Ramsey) and was noted to be in AF. She had an echo and stress test at that time which were unremarkable. She was started on Lasix, Eliquis and BB. She was suggested to undergo a DC cardioversion but preferred to continue to rate control strategies However, her symptoms worsened and she did undergo a DCCV in 1/2023. After cardioversion she reported feeling extremely better One day in May, she was walking and all of a sudden she could not breath. She saw Dr. Purcell and was noted to be back in AF. She underwent a sleep study which was positive for BILL. She presented electively on 7/7/2023 for and is now status post uncomplicated radiofrequency ablation of atrial fibrillation.      Plan:   Bedrest x 4 hours, then OOB with assistance and progress as tolerated.   Groin sutures to be removed by EP service in AM.   Pending groin status: 5mg PO Eliquis to resume at 22:30 tonight.   DO NOT HOLD, INTERRUPT OR REVERSE ANTICOAGULATION WITHOUT EXPLICIT APPROVAL FROM EP SERVICE.   Lasix 40mg IV x 1 dose once ambulating and again in the AM then Lasix 20mg PO daily x 2 more days.   Solu-Cortef 120mg IV once.  Colchicine 0.6mg once daily x 7 days.   Start Protonix 40mg twice daily x 2 weeks, then resume usual once daily dosing.   Start Carafate 1gm BID x 2 weeks.   Continue other home medications.   Strict I/Os.  Please encourage incentive spirometry and ambulation once able.  Observation and monitoring on telemetry overnight with anticipated discharge in the AM and outpt follow up in 2-4 weeks.  PROCEDURE(S): Radiofrequency Ablation of Atrial Fibrillation    ELECTROPHYSIOLOGIST(S): Ricki Moreno MD         COMPLICATIONS:  none        DISPOSITION:  observation     CONDITION: stable  EBL: <15mL    Pt doing well s/p atrial fibrillation ablation (WACA/PVI, roof line and 2 anterior mitral lines) via b/l FV access. CV x 4(360J x 2 and 200J x 2). Denies complaint.     SUNI:   Summary:   1. Left ventricular ejection fraction, by visual estimation, is 55 to   60%.   2. Normal global left ventricular systolic function.   3. The mitral in-flow pattern reveals no discernable A-wave, therefore   no comment on diastolic function can be made.   4. Normal right ventricular size and function, inadequate estimation of   RVSP.   5. Moderately enlarged left atrium.   6. No left atrial appendage thrombus, left atrial appendage enlargement   and normal left atrial appendage velocities.   7. Mildly enlarged right atrium.   8. Trace mitral valve regurgitation.   9. Mild aortic regurgitation.  10. Mild simple atheroma at the aortic arch.  11. There is no evidence of pericardial effusion.    Ray Giron DO Electronically signed on 7/7/2023 at 1:44:02 PM      MEDICATIONS  (STANDING):  amLODIPine   Tablet 5 milliGRAM(s) Oral daily  apixaban 5 milliGRAM(s) Oral <User Schedule>  atorvastatin 20 milliGRAM(s) Oral at bedtime  colchicine 0.6 milliGRAM(s) Oral daily  furosemide   Injectable 20 milliGRAM(s) IV Push once  hydrocortisone sodium succinate Injectable 120 milliGRAM(s) IV Push once  montelukast 10 milliGRAM(s) Oral daily  pantoprazole    Tablet 40 milliGRAM(s) Oral two times a day  sucralfate suspension 1 Gram(s) Oral two times a day    MEDICATIONS  (PRN):  ALPRAZolam 0.25 milliGRAM(s) Oral every 8 hours PRN anxiety/ insomnia  aluminum hydroxide/magnesium hydroxide/simethicone Suspension 30 milliLiter(s) Oral every 4 hours PRN Dyspepsia  benzocaine/menthol Lozenge 1 Lozenge Oral every 2 hours PRN Sore Throat  ondansetron Injectable 4 milliGRAM(s) IV Push every 8 hours PRN Nausea and/or Vomiting      Allergies  Metoprolol Succinate ER (Other)  Percocet 10/325 (Nausea)      Exam:   HR: 70  BP: 120/70  RR: 16  SpO2: 93% NRB    Gen: VSS, NAD, Awake and alert  Card: S1/S2, RRR, no m/g/r  Resp: lungs CTA b/l  Abd: Obese, S/NT/ND  Groins: hemostatic sutures in place; sites C/D/I b/l; no bleeding, hematoma, erythema, exudate or edema  Ext: no edema; distal pulses intact. Aircast to the right ankle  Neuro: CN II-XII grossly intact, JACKSON x4 with strength and sensation intact and equal bilaterally    I/Os: net + 3583    ECG: SR at 64bpm w/ prolonged AV delay (MS 226ms), QRSD 96ms.     Assessment:   71 yo female with PMH of HTN, HLD, GERD, OA s/p bilateral TKR, BILL and persistent AF (on Eliquis). She was initially diagnosed with AF in 9/2022. She developed palpitations, CLOUD, and peripheral edema. She saw her cardiologist (Dr. Ramsey) and was noted to be in AF. She had an echo and stress test at that time which were unremarkable. She was started on Lasix, Eliquis and BB. She was suggested to undergo a DC cardioversion but preferred to continue to rate control strategies However, her symptoms worsened and she did undergo a DCCV in 1/2023. After cardioversion she reported feeling extremely better One day in May, she was walking and all of a sudden she could not breath. She saw Dr. Purcell and was noted to be back in AF. She underwent a sleep study which was positive for BILL. She presented electively on 7/7/2023 for and is now status post uncomplicated radiofrequency ablation of atrial fibrillation.      Plan:   Bedrest x 4 hours, then OOB with assistance and progress as tolerated.   Groin sutures to be removed by EP service in AM.   Pending groin status: 5mg PO Eliquis to resume at 22:30 tonight.   DO NOT HOLD, INTERRUPT OR REVERSE ANTICOAGULATION WITHOUT EXPLICIT APPROVAL FROM EP SERVICE.   Lasix 40mg IV x 1 dose once ambulating and again in the AM then Lasix 20mg PO daily x 2 more days.   Solu-Cortef 120mg IV once.  Colchicine 0.6mg once daily x 7 days.   Start Protonix 40mg twice daily x 2 weeks, then resume usual once daily dosing.   Start Carafate 1gm BID x 2 weeks.   Continue other home medications.   Strict I/Os.  Please encourage incentive spirometry and ambulation once able.  Observation and monitoring on telemetry overnight with anticipated discharge in the AM and outpt follow up in 2-4 weeks.

## 2023-07-07 NOTE — H&P PST ADULT - NSICDXPASTMEDICALHX_GEN_ALL_CORE_FT
PAST MEDICAL HISTORY:  Atrial fibrillation     GERD (Gastroesophageal Reflux Disease)     Herniated Disc- cervical     Herniated Disc- lumbar     HLD (hyperlipidemia)     HTN (Hypertension)     Osteoarthrosis, Localized, Primary, Involving Lower Leg

## 2023-07-07 NOTE — DISCHARGE NOTE PROVIDER - NSDCMRMEDTOKEN_GEN_ALL_CORE_FT
26-Apr-2019 27-Apr-2019 amLODIPine 5 mg oral tablet: 1 orally once a day  atorvastatin 20 mg oral tablet: 1 orally once a day  Eliquis 5 mg oral tablet: 1 orally 2 times a day  montelukast 10 mg oral tablet: 1 orally once a day  nebivolol 10 mg oral tablet: 1 orally once a day  olmesartan 40 mg oral tablet: 1 orally once a day  pantoprazole 40 mg oral delayed release tablet: 1 orally   amLODIPine 5 mg oral tablet: 1 orally once a day  atorvastatin 20 mg oral tablet: 1 orally once a day  colchicine 0.6 mg oral tablet: 1 tab(s) orally once a day x 7 days  Eliquis 5 mg oral tablet: 1 orally 2 times a day  furosemide 20 mg oral tablet: 1 tab(s) orally once a day x 2 days  montelukast 10 mg oral tablet: 1 orally once a day  nebivolol 10 mg oral tablet: 1 orally once a day  olmesartan 40 mg oral tablet: 1 orally once a day  pantoprazole 40 mg oral delayed release tablet: 1 tab(s) orally 2 times a day  sucralfate 1 g/10 mL oral suspension: 10 milliliter(s) orally 2 times a day x 14 days

## 2023-07-07 NOTE — DISCHARGE NOTE PROVIDER - NSDCFUSCHEDAPPT_GEN_ALL_CORE_FT
[facility]  [address]   Scheduled Appointment: 08/01/2023     [facility]  [address]   Scheduled Appointment: 08/01/2023    Tan Monroy  [facility]  [address]  Scheduled Appointment: 09/19/2023

## 2023-07-08 ENCOUNTER — TRANSCRIPTION ENCOUNTER (OUTPATIENT)
Age: 73
End: 2023-07-08

## 2023-07-08 VITALS
RESPIRATION RATE: 16 BRPM | HEART RATE: 76 BPM | DIASTOLIC BLOOD PRESSURE: 65 MMHG | SYSTOLIC BLOOD PRESSURE: 114 MMHG | OXYGEN SATURATION: 97 %

## 2023-07-08 LAB
ANION GAP SERPL CALC-SCNC: 13 MMOL/L — SIGNIFICANT CHANGE UP (ref 5–17)
BUN SERPL-MCNC: 14.6 MG/DL — SIGNIFICANT CHANGE UP (ref 8–20)
CALCIUM SERPL-MCNC: 8.2 MG/DL — LOW (ref 8.4–10.5)
CHLORIDE SERPL-SCNC: 106 MMOL/L — SIGNIFICANT CHANGE UP (ref 96–108)
CO2 SERPL-SCNC: 23 MMOL/L — SIGNIFICANT CHANGE UP (ref 22–29)
CREAT SERPL-MCNC: 0.64 MG/DL — SIGNIFICANT CHANGE UP (ref 0.5–1.3)
EGFR: 94 ML/MIN/1.73M2 — SIGNIFICANT CHANGE UP
GLUCOSE SERPL-MCNC: 155 MG/DL — HIGH (ref 70–99)
HCT VFR BLD CALC: 34.3 % — LOW (ref 34.5–45)
HGB BLD-MCNC: 11.1 G/DL — LOW (ref 11.5–15.5)
MAGNESIUM SERPL-MCNC: 1.7 MG/DL — SIGNIFICANT CHANGE UP (ref 1.6–2.6)
MCHC RBC-ENTMCNC: 28.4 PG — SIGNIFICANT CHANGE UP (ref 27–34)
MCHC RBC-ENTMCNC: 32.4 GM/DL — SIGNIFICANT CHANGE UP (ref 32–36)
MCV RBC AUTO: 87.7 FL — SIGNIFICANT CHANGE UP (ref 80–100)
PLATELET # BLD AUTO: 187 K/UL — SIGNIFICANT CHANGE UP (ref 150–400)
POTASSIUM SERPL-MCNC: 3.5 MMOL/L — SIGNIFICANT CHANGE UP (ref 3.5–5.3)
POTASSIUM SERPL-SCNC: 3.5 MMOL/L — SIGNIFICANT CHANGE UP (ref 3.5–5.3)
RBC # BLD: 3.91 M/UL — SIGNIFICANT CHANGE UP (ref 3.8–5.2)
RBC # FLD: 14 % — SIGNIFICANT CHANGE UP (ref 10.3–14.5)
SODIUM SERPL-SCNC: 141 MMOL/L — SIGNIFICANT CHANGE UP (ref 135–145)
WBC # BLD: 9.43 K/UL — SIGNIFICANT CHANGE UP (ref 3.8–10.5)
WBC # FLD AUTO: 9.43 K/UL — SIGNIFICANT CHANGE UP (ref 3.8–10.5)

## 2023-07-08 PROCEDURE — 92960 CARDIOVERSION ELECTRIC EXT: CPT | Mod: 59

## 2023-07-08 PROCEDURE — 83735 ASSAY OF MAGNESIUM: CPT

## 2023-07-08 PROCEDURE — C1894: CPT

## 2023-07-08 PROCEDURE — 93623 PRGRMD STIMJ&PACG IV RX NFS: CPT

## 2023-07-08 PROCEDURE — 80048 BASIC METABOLIC PNL TOTAL CA: CPT

## 2023-07-08 PROCEDURE — 85027 COMPLETE CBC AUTOMATED: CPT

## 2023-07-08 PROCEDURE — 86900 BLOOD TYPING SEROLOGIC ABO: CPT

## 2023-07-08 PROCEDURE — 93312 ECHO TRANSESOPHAGEAL: CPT

## 2023-07-08 PROCEDURE — C1889: CPT

## 2023-07-08 PROCEDURE — 86850 RBC ANTIBODY SCREEN: CPT

## 2023-07-08 PROCEDURE — C1759: CPT

## 2023-07-08 PROCEDURE — C9399: CPT

## 2023-07-08 PROCEDURE — 93005 ELECTROCARDIOGRAM TRACING: CPT

## 2023-07-08 PROCEDURE — 86901 BLOOD TYPING SEROLOGIC RH(D): CPT

## 2023-07-08 PROCEDURE — 93325 DOPPLER ECHO COLOR FLOW MAPG: CPT

## 2023-07-08 PROCEDURE — 36415 COLL VENOUS BLD VENIPUNCTURE: CPT

## 2023-07-08 PROCEDURE — 93655 ICAR CATH ABLTJ DSCRT ARRHYT: CPT

## 2023-07-08 PROCEDURE — 93656 COMPRE EP EVAL ABLTJ ATR FIB: CPT

## 2023-07-08 PROCEDURE — 85610 PROTHROMBIN TIME: CPT

## 2023-07-08 PROCEDURE — C1732: CPT

## 2023-07-08 PROCEDURE — 93320 DOPPLER ECHO COMPLETE: CPT

## 2023-07-08 PROCEDURE — 85730 THROMBOPLASTIN TIME PARTIAL: CPT

## 2023-07-08 PROCEDURE — 93010 ELECTROCARDIOGRAM REPORT: CPT

## 2023-07-08 PROCEDURE — 93622 COMP EP EVAL L VENTR PAC&REC: CPT

## 2023-07-08 PROCEDURE — C1766: CPT

## 2023-07-08 RX ORDER — SUCRALFATE 1 G
10 TABLET ORAL
Qty: 280 | Refills: 0
Start: 2023-07-08 | End: 2023-07-21

## 2023-07-08 RX ORDER — PANTOPRAZOLE SODIUM 20 MG/1
1 TABLET, DELAYED RELEASE ORAL
Refills: 0 | DISCHARGE
Start: 2023-07-08

## 2023-07-08 RX ORDER — FUROSEMIDE 40 MG
1 TABLET ORAL
Qty: 2 | Refills: 0
Start: 2023-07-08 | End: 2023-07-09

## 2023-07-08 RX ORDER — PANTOPRAZOLE SODIUM 20 MG/1
1 TABLET, DELAYED RELEASE ORAL
Refills: 0 | DISCHARGE

## 2023-07-08 RX ORDER — MAGNESIUM SULFATE 500 MG/ML
1 VIAL (ML) INJECTION ONCE
Refills: 0 | Status: COMPLETED | OUTPATIENT
Start: 2023-07-08 | End: 2023-07-08

## 2023-07-08 RX ORDER — COLCHICINE 0.6 MG
1 TABLET ORAL
Qty: 7 | Refills: 0
Start: 2023-07-08 | End: 2023-07-14

## 2023-07-08 RX ORDER — POTASSIUM CHLORIDE 20 MEQ
40 PACKET (EA) ORAL ONCE
Refills: 0 | Status: COMPLETED | OUTPATIENT
Start: 2023-07-08 | End: 2023-07-08

## 2023-07-08 RX ADMIN — Medication 40 MILLIEQUIVALENT(S): at 06:16

## 2023-07-08 RX ADMIN — Medication 100 GRAM(S): at 06:14

## 2023-07-08 RX ADMIN — LOSARTAN POTASSIUM 100 MILLIGRAM(S): 100 TABLET, FILM COATED ORAL at 06:22

## 2023-07-08 RX ADMIN — AMLODIPINE BESYLATE 5 MILLIGRAM(S): 2.5 TABLET ORAL at 06:20

## 2023-07-08 RX ADMIN — Medication 1 GRAM(S): at 06:22

## 2023-07-08 RX ADMIN — PANTOPRAZOLE SODIUM 40 MILLIGRAM(S): 20 TABLET, DELAYED RELEASE ORAL at 06:20

## 2023-07-08 RX ADMIN — Medication 0.6 MILLIGRAM(S): at 06:25

## 2023-07-08 RX ADMIN — NEBIVOLOL HYDROCHLORIDE 10 MILLIGRAM(S): 5 TABLET ORAL at 06:17

## 2023-07-08 RX ADMIN — APIXABAN 5 MILLIGRAM(S): 2.5 TABLET, FILM COATED ORAL at 10:53

## 2023-07-08 RX ADMIN — Medication 40 MILLIGRAM(S): at 06:48

## 2023-07-08 NOTE — DISCHARGE NOTE NURSING/CASE MANAGEMENT/SOCIAL WORK - PATIENT PORTAL LINK FT
You can access the FollowMyHealth Patient Portal offered by [facility] by registering at the following website: [email or web address]. By joining [employer]’s FollowMyHealth portal, you will also be able to view your health information using other applications (apps) compatible with our system.

## 2023-07-08 NOTE — DISCHARGE NOTE NURSING/CASE MANAGEMENT/SOCIAL WORK - NSDCFUADDAPPT_GEN_ALL_CORE_FT
Our office will contact you in 3-5 days to schedule this appointment. Please call [phone] with questions or concerns.

## 2023-07-08 NOTE — DISCHARGE NOTE NURSING/CASE MANAGEMENT/SOCIAL WORK - NSDCPEELIQUISREACT_GEN_ALL_CORE
pt ambulates with and without a RW, initially due to reports of Duy foot pain that subsides with ambulation, at which point pt ambulates without an AD Apixaban/Eliquis increases your risk for bleeding. Notify your doctor if you experience any of the following side effects: bleeding, coughing or vomiting blood, red or black stool, unexpected pain or swelling, itching or hives, chest pain, chest tightness, trouble breathing, changes in how much or how often you urinate, red or pink urine, numbness or tingling in your feet, or unusual muscle weakness. When Apixaban/Eliquis is taken with other medicines, they can affect how it works. Taking other medications such as aspirin, blood thinners, nonsteroidal anti-inflammatories, and medications that treat depression can increase your risk of bleeding. It is very important to tell your health care provider about all of the other medicines, including over-the-counter medications, herbs, and vitamins you are taking. DO NOT start, stop, or change the dosage of any medicine, including over-the-counter medicines, vitamins, and herbal products without your doctor’s approval. Any products containing aspirin or are nonsteroidal anti-inflammatories lessen the blood’s ability to form clots and add to the effect of Apixaban/Eliquis. Never take aspirin or medicines that contain aspirin without speaking to your doctor. none

## 2023-07-08 NOTE — DISCHARGE NOTE NURSING/CASE MANAGEMENT/SOCIAL WORK - NSDCPEFALRISK_GEN_ALL_CORE
For information on Fall & Injury Prevention, visit: [email or web address] OR  [email or web address] OR  https://www.cdc.gov/steadi/patient.html

## 2023-07-08 NOTE — PROGRESS NOTE ADULT - SUBJECTIVE AND OBJECTIVE BOX
Patient seen and examined today in bed. Reports frequent urination post procedure. No other complaints. Figure 8 sutures removed from right and left groin without complication.   Potassium and magnesium supplemented by overnight staff.     EKG: pending   TELE: SR no events.     MEDICATIONS  (STANDING):  amLODIPine   Tablet 5 milliGRAM(s) Oral daily  apixaban 5 milliGRAM(s) Oral <User Schedule>  atorvastatin 20 milliGRAM(s) Oral at bedtime  colchicine 0.6 milliGRAM(s) Oral daily  losartan 100 milliGRAM(s) Oral daily  montelukast 10 milliGRAM(s) Oral daily  nebivolol 10 milliGRAM(s) Oral daily  pantoprazole    Tablet 40 milliGRAM(s) Oral two times a day  sucralfate suspension 1 Gram(s) Oral two times a day    MEDICATIONS  (PRN):  ALPRAZolam 0.25 milliGRAM(s) Oral every 8 hours PRN anxiety/ insomnia  aluminum hydroxide/magnesium hydroxide/simethicone Suspension 30 milliLiter(s) Oral every 4 hours PRN Dyspepsia  benzocaine/menthol Lozenge 1 Lozenge Oral every 2 hours PRN Sore Throat  ondansetron Injectable 4 milliGRAM(s) IV Push every 8 hours PRN Nausea and/or Vomiting    Allergies  Metoprolol Succinate ER (Other)  Percocet 10/325 (Nausea)    PAST MEDICAL & SURGICAL HISTORY:  HTN (Hypertension)  GERD (Gastroesophageal Reflux Disease)  Herniated Disc- cervical  Osteoarthrosis, Localized, Primary, Involving Lower Leg  Herniated Disc- lumbar  HLD (hyperlipidemia)  Atrial fibrillation  S/P Arthroscopy of Right Knee  History of Total Hysterectomy with Removal of Both Tubes and Ovaries  S/P Carpal Tunnel Release  right  S/P Carpal Tunnel Release  left  History of Breast Lump/Mass Excision  right  Trigger finger release- left  Herniated Disc- lumbar  S/P Shoulder Surgery    Vital Signs Last 24 Hrs  T(C): 36.4 (08 Jul 2023 06:20), Max: 36.5 (07 Jul 2023 09:54)  T(F): 97.5 (08 Jul 2023 06:20), Max: 97.7 (07 Jul 2023 09:54)  HR: 76 (08 Jul 2023 07:40) (61 - 102)  BP: 114/65 (08 Jul 2023 07:40) (105/61 - 137/88)  BP(mean): 110 (07 Jul 2023 09:08) (110 - 110)  RR: 16 (08 Jul 2023 07:40) (15 - 21)  SpO2: 97% (08 Jul 2023 07:40) (92% - 97%)    Physical Exam:  Constitutional: NAD, AAOx3  Cardiovascular: +S1S2 RRR  Pulmonary: CTA b/l, unlabored  GI: soft NTND +BS  Extremities: no pedal edema,   Right and left groin: No hematoma or ecchymosis noted.   Neuro: non focal, JACKSON x4    LABS:                        11.1   9.43  )-----------( 187      ( 08 Jul 2023 03:45 )             34.3     141  |  106  |  14.6  ----------------------------<  155<H>  3.5   |  23.0  |  0.64  Ca    8.2<L>      08 Jul 2023 03:45  Mg     1.7     07-08  PT/INR - ( 07 Jul 2023 10:06 )   PT: 14.0 sec;   INR: 1.20 ratio    PTT - ( 07 Jul 2023 10:06 )  PTT:33.1 sec    Urinalysis Basic - ( 08 Jul 2023 03:45   Color: x / Appearance: x / SG: x / pH: x  Gluc: 155 mg/dL / Ketone: x  / Bili: x / Urobili: x   Blood: x / Protein: x / Nitrite: x   Leuk Esterase: x / RBC: x / WBC x   Sq Epi: x / Non Sq Epi: x / Bacteria: x    A/P  73 yo female with PMH of HTN, HLD, GERD, OA s/p bilateral TKR, BILL and persistent AF (on Eliquis) who is now POD#1 status post uncomplicated radiofrequency ablation of atrial fibrillation.    - Discharge home today  - Patient has own home Rx of Protonix which she will take BID x 14 days

## 2023-07-11 PROBLEM — E78.5 HYPERLIPIDEMIA, UNSPECIFIED: Chronic | Status: ACTIVE | Noted: 2023-07-07

## 2023-07-11 PROBLEM — I48.91 UNSPECIFIED ATRIAL FIBRILLATION: Chronic | Status: ACTIVE | Noted: 2023-07-07

## 2023-07-13 ENCOUNTER — APPOINTMENT (OUTPATIENT)
Dept: ORTHOPEDIC SURGERY | Facility: CLINIC | Age: 73
End: 2023-07-13
Payer: MEDICARE

## 2023-07-13 PROCEDURE — 73610 X-RAY EXAM OF ANKLE: CPT | Mod: RT

## 2023-07-13 PROCEDURE — 99213 OFFICE O/P EST LOW 20 MIN: CPT

## 2023-07-13 NOTE — PHYSICAL EXAM
[de-identified] : Examination of the right foot and ankle is as follows:\par Inspection: swelling, ecchymosis of foot and ankle, moderate swelling of dorsal foot and lateral ankle, but no abrasion, laceration, no erythema\par Palpation: lateral malleolus tenderness, lateral ligament tenderness, tenderness to palpation over cuboid\par ROM: plantarflexion 20 degrees, inversion 15 degrees, eversion 10 degrees, dorsiflexion 10 degrees\par Strength: dorsiflexion 4/5, plantar flexion 4/5, inversion 4/5, eversion 4/5, EHL 5/5, FHL 5/5\par Vascular: dorsalis pedis pulse: 1+, posterior tibialis pulse: 1+\par Neuro: Sensation present to light touch in all distributions\par Gait: antalgic, ambulation with RW, RLE air cast\par \par X-rays of the right ankle is as follows: outside x-rays reviewed from [facility]\par Ankle 3 view: closed, nondisplaced, lateral malleolar avulsion.

## 2023-07-13 NOTE — ASSESSMENT
[FreeTextEntry1] : 71 yo female presenting with right foot sprain and avulsion, ankle sprain of lateral malleolus. Recommend non-surgical management.  Recommended patient to use compression stocking for swelling of right ankle and foot.\par -RLE WBAT in aircast. or short cam boot \par -Activities as tolerated, avoid strenuous/impact related activities\par -Rest, ice, compression, elevation, NSAIDs PRN for pain. \par -All questions answered\par -F/u 6 weeks \par \par The diagnosis was explained in detail. The potential non-surgical and surgical treatments were reviewed. The relative risks and benefits of each option were considered relative to the patient’s age, activity level, medical history, symptom severity and previously attempted treatments.\par \par The patient was advised to consult with their primary medical provider prior to initiation of any new medications to reduce the risk of adverse effects specific to their long-term home medications and medical history. The risk of gastrointestinal irritation and kidney injury specific to long-term NSAID use was discussed.\par \par

## 2023-07-13 NOTE — HISTORY OF PRESENT ILLNESS
[Sudden] : sudden [Dull/Aching] : dull/aching [Throbbing] : throbbing [Household chores] : household chores [Leisure] : leisure [Social interactions] : social interactions [Rest] : rest [Retired] : Work status: retired [7] : 7 [10] : 10 [Burning] : burning [Sharp] : sharp [Stabbing] : stabbing [Constant] : constant [de-identified] : Patient is here for her right foot/ankle. Patient is being treated for Closed avulsion fracture of lateral malleolus of right fibula, Sprain of anterior talofibular ligament of right ankle and Sprain of right foot. Patient states she did not start PT and she does not go anywhere so she does not really wear the cam boot. Patient states she has constant sharp, dull, stabbing and throbbing pain and concerned with ankle swelling.  Denies calf swelling or pain.  Denies sob/n/v/cp.    [] : Post Surgical Visit: no [FreeTextEntry1] : Right foot/ankle  [FreeTextEntry3] : 6/8/2023 [FreeTextEntry5] :  Patient states her ankle gave way and she fell  [de-identified] : Movement

## 2023-08-01 ENCOUNTER — APPOINTMENT (OUTPATIENT)
Dept: ORTHOPEDIC SURGERY | Facility: CLINIC | Age: 73
End: 2023-08-01

## 2023-08-21 NOTE — PHYSICAL EXAM
[de-identified] : B/L ankle aircasts in place> Aircast  LLE was removed as well as the KI.\par \par Left knee: Positive mild effusion. There is quad atrophy noted LLE: Scar noted Left knee without any signs of infection.  Patient is able to SLR against gravity. quad tendon intact.  Positive joint line TTP. ROM 0-95 with pain on extreme flexion. calf soft NT \par No laxity noted. no significant drawer

## 2023-08-21 NOTE — DISCUSSION/SUMMARY
[de-identified] : She had Ivett Natural flex design  Tibia size 3, Size 4 femur 18mm patella 11mm POLY untraconguent prolong Discussed poly exchange when she recovers from her knee contusions and ankle injuries, discussed if there is any damage to the metal prosthesis a total revision could be indicated. Discussed that if there is damage to the metal surfaces, they could lead to sooner wear of the poly.  She is having cardiac ablation 7/7/23. for A fib. She is currently on Eliquis.  Plan at this time is to go forward with poly exchange, with the understanding that a total knee would be indicated if there is any damage to the hardware. Patient understands this  LEft Ivett Natural Gender Flex POly Exchange, Possible Personna Revision  on 6/8/23 and 6/9/23 at [facility]  xrays , CT, and MRI of the Left knee were performed which show significant poly wear but no metal on metal contact.  no frx were noted. Xrays show significant poly wear and possible subluxation tibiofemoral joint.  On exam there was no significant instability Discussed options including observation vs poly exchange.  There is concern she may continue to wear the poly down to metal on metal and cause wear/metal debris.   she would require full blown revision.   She has difficulty getting up seated position and using stairs.

## 2023-08-21 NOTE — HISTORY OF PRESENT ILLNESS
[de-identified] : return patient is here today for her LT Knee. Patient is S/P LT TKA 10-15 years ago. Pt advised to get op notes and follow up with us.  Patient fell approx 3 weeks ago>  She states the Left knee /leg is still swollen but improved ROM.  She has been getting home PT for b/l knees.  She is seeing Dr Cline for b/l ankle injuries and currently using a cam walker RLE.  she obtained her Left TKA op note

## 2023-08-24 ENCOUNTER — APPOINTMENT (OUTPATIENT)
Dept: ORTHOPEDIC SURGERY | Facility: CLINIC | Age: 73
End: 2023-08-24
Payer: MEDICARE

## 2023-08-24 DIAGNOSIS — S93.491D SPRAIN OF OTHER LIGAMENT OF RIGHT ANKLE, SUBSEQUENT ENCOUNTER: ICD-10-CM

## 2023-08-24 DIAGNOSIS — S82.61XD DISPLACED FRACTURE OF LATERAL MALLEOLUS OF RIGHT FIBULA, SUBSEQUENT ENCOUNTER FOR CLOSED FRACTURE WITH ROUTINE HEALING: ICD-10-CM

## 2023-08-24 DIAGNOSIS — S93.601D UNSPECIFIED SPRAIN OF RIGHT FOOT, SUBSEQUENT ENCOUNTER: ICD-10-CM

## 2023-08-24 PROCEDURE — 99213 OFFICE O/P EST LOW 20 MIN: CPT

## 2023-08-24 NOTE — HISTORY OF PRESENT ILLNESS
[Sudden] : sudden [0] : 0 [Intermittent] : intermittent [Rest] : rest [Retired] : Work status: retired [de-identified] : Patient is here for her right foot/ankle. Patient is being treated for Closed avulsion fracture of lateral malleolus of right fibula, Sprain of anterior talofibular ligament of right ankle and Sprain of right foot. Patient states she still gets swelling but, no pian.  [] : Post Surgical Visit: no [FreeTextEntry1] : Right foot/ankle  [FreeTextEntry3] : 6/8/2023 [FreeTextEntry5] :  Patient states her ankle gave way and she fell

## 2023-08-24 NOTE — ASSESSMENT
[FreeTextEntry1] : 73 yo female presenting for f/u of right foot sprain and avulsion, ankle sprain of lateral malleolus. Patient reports that she has no pain only swelling. -RLE WBAT -Activities as tolerated, no restrictions -Advised compression stocking to help control ankle swelling -Rest, ice, compression, elevation, NSAIDs PRN for pain.  -All questions answered -F/u PRN  The diagnosis was explained in detail. The potential non-surgical and surgical treatments were reviewed. The relative risks and benefits of each option were considered relative to the patient's age, activity level, medical history, symptom severity and previously attempted treatments.  The patient was advised to consult with their primary medical provider prior to initiation of any new medications to reduce the risk of adverse effects specific to their long-term home medications and medical history. The risk of gastrointestinal irritation and kidney injury specific to long-term NSAID use was discussed.  Entered by Ridge Angeles PA-C acting as scribe. Dr. Cline Attestation The documentation recorded by the scribe, in my presence, accurately reflects the service I, Dr. Cline, personally performed, and the decisions made by me with my edits as appropriate.

## 2023-08-24 NOTE — PHYSICAL EXAM
[de-identified] : Examination of the right foot and ankle is as follows: Inspection: swelling, mild swelling of dorsal foot and lateral ankle, but no abrasion, laceration, no erythema, no ecchymosis Palpation: no lateral malleolus tenderness, no lateral ligament tenderness, no tenderness to palpation over cuboid ROM: plantarflexion 40 degrees, inversion 30 degrees, eversion 20 degrees, dorsiflexion 20 degrees Strength: dorsiflexion 5/5, plantar flexion 5/5, inversion 5/5, eversion 5/5, EHL 5/5, FHL 5/5 Vascular: dorsalis pedis pulse: 1+, posterior tibialis pulse: 1+ Neuro: Sensation present to light touch in all distributions Gait: non-antalgic, patient ambulates without assistive devices

## 2023-09-19 ENCOUNTER — APPOINTMENT (OUTPATIENT)
Dept: ORTHOPEDIC SURGERY | Facility: HOSPITAL | Age: 73
End: 2023-09-19

## 2023-10-27 ENCOUNTER — APPOINTMENT (OUTPATIENT)
Dept: ORTHOPEDIC SURGERY | Facility: HOSPITAL | Age: 73
End: 2023-10-27
Payer: MEDICARE

## 2023-10-27 PROCEDURE — 27486 REVISE/REPLACE KNEE JOINT: CPT | Mod: LT

## 2023-10-27 PROCEDURE — 20610 DRAIN/INJ JOINT/BURSA W/O US: CPT | Mod: 59,LT

## 2023-10-27 PROCEDURE — 27486 REVISE/REPLACE KNEE JOINT: CPT | Mod: AS,LT

## 2023-11-06 ENCOUNTER — APPOINTMENT (OUTPATIENT)
Dept: ORTHOPEDIC SURGERY | Facility: CLINIC | Age: 73
End: 2023-11-06
Payer: COMMERCIAL

## 2023-11-06 PROCEDURE — 73560 X-RAY EXAM OF KNEE 1 OR 2: CPT | Mod: LT

## 2023-11-06 PROCEDURE — 99024 POSTOP FOLLOW-UP VISIT: CPT

## 2023-11-27 ENCOUNTER — RESULT REVIEW (OUTPATIENT)
Age: 73
End: 2023-11-27

## 2023-11-27 ENCOUNTER — APPOINTMENT (OUTPATIENT)
Dept: ORTHOPEDIC SURGERY | Facility: CLINIC | Age: 73
End: 2023-11-27
Payer: MEDICARE

## 2023-11-27 PROCEDURE — 99024 POSTOP FOLLOW-UP VISIT: CPT

## 2024-01-24 ENCOUNTER — APPOINTMENT (OUTPATIENT)
Dept: ORTHOPEDIC SURGERY | Facility: CLINIC | Age: 74
End: 2024-01-24
Payer: MEDICARE

## 2024-01-24 PROCEDURE — 99024 POSTOP FOLLOW-UP VISIT: CPT

## 2024-01-24 NOTE — HISTORY OF PRESENT ILLNESS
[de-identified] : following up for the left knee. Patient is s/p L knee poly exchange 10/27/2023.  Patient states the knee has been stiff. She admits she has not been to PT since Thxgiving because she developed covid as well as ear infection and was recovering.  She notes swelling of the knee and numbness since post op just lateral to the incision.

## 2024-01-24 NOTE — PHYSICAL EXAM
[de-identified] : Constitutional: The patient appears well developed, well nourished.       Neurologic: Coordination is normal. Alert and oriented to time, place and person. No evidence of mood disorder, calm affect.            LEFT KNEE: Inspection of the knee is as follows: MILD TO moderate effusion and   NO ecchymosis. no streaking, no erythema, no atrophy, no deformities of the quad tendon and no deformities of patellar tendon.       Inspection of the wound reveals WOUNDS WELL HEALED. Proxm aspect of the scar there is a  superfiicial suture abscess which was expressed and cleaned . small amount of suture was removed.   There are no signs of infection to the knee.     Palpation of the knee is as follows: no increased warmth.      Knee Range of Motion is as follows in degrees:        Extension: 0   Flexion: 115       Strength examination of the knee is as follows:    Quadriceps strength is 5/5    Hamstring strength is 5/5       Ligament Stability and Special Test ligamentously stable and no varus or valgus instability. patella tracks well and able to do active straight leg raise without an extensor lag.        Gait and function is as follows:   Sensation diminished just lateral to incision

## 2024-01-24 NOTE — DISCUSSION/SUMMARY
[de-identified] : Discussed options including returning to PT.  She will consider PT and call us if she wants an Rx.  She will continue her HEP.  She understands the knee may be swollen for up to 1 yr.   patient was instructed to take antibiotic prophylaxis prior to any dental or GI procedures for life

## 2024-04-15 ENCOUNTER — APPOINTMENT (OUTPATIENT)
Dept: ORTHOPEDIC SURGERY | Facility: CLINIC | Age: 74
End: 2024-04-15
Payer: MEDICARE

## 2024-04-15 DIAGNOSIS — Z96.652 PRESENCE OF LEFT ARTIFICIAL KNEE JOINT: ICD-10-CM

## 2024-04-15 PROCEDURE — 99213 OFFICE O/P EST LOW 20 MIN: CPT

## 2024-04-15 NOTE — DISCUSSION/SUMMARY
[de-identified] : She is seeing Dr Fletcher for how low back pain this Wed.  She has tried muscle relaxants.  She will f/u with us in 8 mos for repeat eval and xrays.

## 2024-04-15 NOTE — HISTORY OF PRESENT ILLNESS
[de-identified] : following up for the left knee. Patient is s/p L knee poly exchange 10/27/2023. Patient states she had COVID and PNA and has not been to PT since December.  She has noted low back pain Right sided since March. She was putting on Compression stockings.  She states the Left knee has been doing pretty well however she still notes swelling and soreness of the knee.

## 2024-04-15 NOTE — PHYSICAL EXAM
[de-identified] : Constitutional: The patient appears well developed, well nourished.       Neurologic: Coordination is normal. Alert and oriented to time, place and person. No evidence of mood disorder, calm affect.            LEFT KNEE: Inspection of the knee is as follows: MILD TO moderate effusion and   NO ecchymosis. no streaking, no erythema, no atrophy, no deformities of the quad tendon and no deformities of patellar tendon.       Inspection of the wound reveals WOUNDS WELL HEALED.Sptting suture was removed. .   There are no signs of infection to the knee.     Palpation of the knee is as follows: no increased warmth.      Knee Range of Motion is as follows in degrees:        Extension: 0   Flexion: 120       Strength examination of the knee is as follows:    Quadriceps strength is 5/5    Hamstring strength is 5/5       Ligament Stability and Special Test ligamentously stable and no varus or valgus instability. patella tracks well and able to do active straight leg raise without an extensor lag.        Gait and function is as follows:   Sensation diminished just lateral to incision  Q/H/AT/GS 5/5 Pat/ankle reflexes are 2 + b/l  SHE HAS PAINLESS ROM OF HER HIP

## 2024-05-08 ENCOUNTER — INPATIENT (INPATIENT)
Facility: HOSPITAL | Age: 74
LOS: 0 days | Discharge: HOME | DRG: 274 | End: 2024-05-09
Attending: INTERNAL MEDICINE | Admitting: INTERNAL MEDICINE
Payer: COMMERCIAL

## 2024-05-08 ENCOUNTER — TRANSCRIPTION ENCOUNTER (OUTPATIENT)
Age: 74
End: 2024-05-08

## 2024-05-08 ENCOUNTER — RESULT REVIEW (OUTPATIENT)
Age: 74
End: 2024-05-08

## 2024-05-08 VITALS
WEIGHT: 229.94 LBS | HEART RATE: 74 BPM | RESPIRATION RATE: 18 BRPM | HEIGHT: 67 IN | DIASTOLIC BLOOD PRESSURE: 81 MMHG | TEMPERATURE: 98 F | OXYGEN SATURATION: 97 % | SYSTOLIC BLOOD PRESSURE: 102 MMHG

## 2024-05-08 DIAGNOSIS — I48.91 UNSPECIFIED ATRIAL FIBRILLATION: ICD-10-CM

## 2024-05-08 LAB
ANION GAP SERPL CALC-SCNC: 10 MMOL/L — SIGNIFICANT CHANGE UP (ref 5–17)
APTT BLD: 36.7 SEC — HIGH (ref 24.5–35.6)
BLD GP AB SCN SERPL QL: SIGNIFICANT CHANGE UP
BUN SERPL-MCNC: 19.9 MG/DL — SIGNIFICANT CHANGE UP (ref 8–20)
CALCIUM SERPL-MCNC: 9.3 MG/DL — SIGNIFICANT CHANGE UP (ref 8.4–10.5)
CHLORIDE SERPL-SCNC: 103 MMOL/L — SIGNIFICANT CHANGE UP (ref 96–108)
CO2 SERPL-SCNC: 29 MMOL/L — SIGNIFICANT CHANGE UP (ref 22–29)
CREAT SERPL-MCNC: 0.76 MG/DL — SIGNIFICANT CHANGE UP (ref 0.5–1.3)
EGFR: 83 ML/MIN/1.73M2 — SIGNIFICANT CHANGE UP
GLUCOSE SERPL-MCNC: 92 MG/DL — SIGNIFICANT CHANGE UP (ref 70–99)
HCT VFR BLD CALC: 40.7 % — SIGNIFICANT CHANGE UP (ref 34.5–45)
HGB BLD-MCNC: 13.6 G/DL — SIGNIFICANT CHANGE UP (ref 11.5–15.5)
INR BLD: 1.23 RATIO — HIGH (ref 0.85–1.18)
MAGNESIUM SERPL-MCNC: 2 MG/DL — SIGNIFICANT CHANGE UP (ref 1.6–2.6)
MCHC RBC-ENTMCNC: 29.1 PG — SIGNIFICANT CHANGE UP (ref 27–34)
MCHC RBC-ENTMCNC: 33.4 GM/DL — SIGNIFICANT CHANGE UP (ref 32–36)
MCV RBC AUTO: 87.2 FL — SIGNIFICANT CHANGE UP (ref 80–100)
PLATELET # BLD AUTO: 252 K/UL — SIGNIFICANT CHANGE UP (ref 150–400)
POTASSIUM SERPL-MCNC: 3.8 MMOL/L — SIGNIFICANT CHANGE UP (ref 3.5–5.3)
POTASSIUM SERPL-SCNC: 3.8 MMOL/L — SIGNIFICANT CHANGE UP (ref 3.5–5.3)
PROTHROM AB SERPL-ACNC: 13.6 SEC — HIGH (ref 9.5–13)
RBC # BLD: 4.67 M/UL — SIGNIFICANT CHANGE UP (ref 3.8–5.2)
RBC # FLD: 13.6 % — SIGNIFICANT CHANGE UP (ref 10.3–14.5)
SODIUM SERPL-SCNC: 142 MMOL/L — SIGNIFICANT CHANGE UP (ref 135–145)
WBC # BLD: 6.99 K/UL — SIGNIFICANT CHANGE UP (ref 3.8–10.5)
WBC # FLD AUTO: 6.99 K/UL — SIGNIFICANT CHANGE UP (ref 3.8–10.5)

## 2024-05-08 PROCEDURE — 93312 ECHO TRANSESOPHAGEAL: CPT | Mod: 26

## 2024-05-08 PROCEDURE — 93320 DOPPLER ECHO COMPLETE: CPT | Mod: 26

## 2024-05-08 PROCEDURE — 93325 DOPPLER ECHO COLOR FLOW MAPG: CPT | Mod: 26

## 2024-05-08 PROCEDURE — 93010 ELECTROCARDIOGRAM REPORT: CPT | Mod: 76

## 2024-05-08 PROCEDURE — 93656 COMPRE EP EVAL ABLTJ ATR FIB: CPT

## 2024-05-08 PROCEDURE — 93655 ICAR CATH ABLTJ DSCRT ARRHYT: CPT

## 2024-05-08 PROCEDURE — 93657 TX L/R ATRIAL FIB ADDL: CPT

## 2024-05-08 RX ORDER — APIXABAN 2.5 MG/1
1 TABLET, FILM COATED ORAL
Refills: 0 | DISCHARGE

## 2024-05-08 RX ORDER — CHOLECALCIFEROL (VITAMIN D3) 125 MCG
1 CAPSULE ORAL
Refills: 0 | DISCHARGE

## 2024-05-08 RX ORDER — ACETAMINOPHEN 500 MG
650 TABLET ORAL EVERY 6 HOURS
Refills: 0 | Status: DISCONTINUED | OUTPATIENT
Start: 2024-05-08 | End: 2024-05-09

## 2024-05-08 RX ORDER — PANTOPRAZOLE SODIUM 20 MG/1
40 TABLET, DELAYED RELEASE ORAL EVERY 12 HOURS
Refills: 0 | Status: DISCONTINUED | OUTPATIENT
Start: 2024-05-08 | End: 2024-05-09

## 2024-05-08 RX ORDER — BENZOCAINE AND MENTHOL 5; 1 G/100ML; G/100ML
1 LIQUID ORAL
Refills: 0 | Status: DISCONTINUED | OUTPATIENT
Start: 2024-05-08 | End: 2024-05-09

## 2024-05-08 RX ORDER — SUCRALFATE 1 G
1 TABLET ORAL EVERY 12 HOURS
Refills: 0 | Status: DISCONTINUED | OUTPATIENT
Start: 2024-05-08 | End: 2024-05-09

## 2024-05-08 RX ORDER — DRONEDARONE 400 MG/1
400 TABLET, FILM COATED ORAL
Refills: 0 | Status: DISCONTINUED | OUTPATIENT
Start: 2024-05-08 | End: 2024-05-09

## 2024-05-08 RX ORDER — ONDANSETRON 8 MG/1
4 TABLET, FILM COATED ORAL EVERY 6 HOURS
Refills: 0 | Status: DISCONTINUED | OUTPATIENT
Start: 2024-05-08 | End: 2024-05-09

## 2024-05-08 RX ORDER — MONTELUKAST 4 MG/1
1 TABLET, CHEWABLE ORAL
Refills: 0 | DISCHARGE

## 2024-05-08 RX ORDER — NEBIVOLOL HYDROCHLORIDE 5 MG/1
10 TABLET ORAL DAILY
Refills: 0 | Status: DISCONTINUED | OUTPATIENT
Start: 2024-05-09 | End: 2024-05-09

## 2024-05-08 RX ORDER — APIXABAN 2.5 MG/1
5 TABLET, FILM COATED ORAL
Refills: 0 | Status: DISCONTINUED | OUTPATIENT
Start: 2024-05-08 | End: 2024-05-09

## 2024-05-08 RX ORDER — NEBIVOLOL HYDROCHLORIDE 5 MG/1
1 TABLET ORAL
Refills: 0 | DISCHARGE

## 2024-05-08 RX ORDER — LOSARTAN POTASSIUM 100 MG/1
100 TABLET, FILM COATED ORAL DAILY
Refills: 0 | Status: DISCONTINUED | OUTPATIENT
Start: 2024-05-08 | End: 2024-05-09

## 2024-05-08 RX ORDER — OLMESARTAN MEDOXOMIL 5 MG/1
1 TABLET, FILM COATED ORAL
Refills: 0 | DISCHARGE

## 2024-05-08 RX ORDER — FUROSEMIDE 40 MG
40 TABLET ORAL ONCE
Refills: 0 | Status: COMPLETED | OUTPATIENT
Start: 2024-05-08 | End: 2024-05-08

## 2024-05-08 RX ORDER — ATORVASTATIN CALCIUM 80 MG/1
1 TABLET, FILM COATED ORAL
Refills: 0 | DISCHARGE

## 2024-05-08 RX ORDER — ATORVASTATIN CALCIUM 80 MG/1
20 TABLET, FILM COATED ORAL AT BEDTIME
Refills: 0 | Status: DISCONTINUED | OUTPATIENT
Start: 2024-05-08 | End: 2024-05-09

## 2024-05-08 RX ORDER — HYDROCHLOROTHIAZIDE 25 MG
1 TABLET ORAL
Refills: 0 | DISCHARGE

## 2024-05-08 RX ORDER — AMLODIPINE BESYLATE 2.5 MG/1
1 TABLET ORAL
Refills: 0 | DISCHARGE

## 2024-05-08 RX ORDER — FUROSEMIDE 40 MG
20 TABLET ORAL DAILY
Refills: 0 | Status: DISCONTINUED | OUTPATIENT
Start: 2024-05-09 | End: 2024-05-09

## 2024-05-08 RX ORDER — ALPRAZOLAM 0.25 MG
0.25 TABLET ORAL EVERY 8 HOURS
Refills: 0 | Status: DISCONTINUED | OUTPATIENT
Start: 2024-05-08 | End: 2024-05-09

## 2024-05-08 RX ORDER — AMLODIPINE BESYLATE 2.5 MG/1
5 TABLET ORAL DAILY
Refills: 0 | Status: DISCONTINUED | OUTPATIENT
Start: 2024-05-08 | End: 2024-05-09

## 2024-05-08 RX ORDER — ASCORBIC ACID 60 MG
1 TABLET,CHEWABLE ORAL
Refills: 0 | DISCHARGE

## 2024-05-08 RX ADMIN — BENZOCAINE AND MENTHOL 1 LOZENGE: 5; 1 LIQUID ORAL at 21:35

## 2024-05-08 RX ADMIN — Medication 40 MILLIGRAM(S): at 19:46

## 2024-05-08 RX ADMIN — APIXABAN 5 MILLIGRAM(S): 2.5 TABLET, FILM COATED ORAL at 21:35

## 2024-05-08 RX ADMIN — BENZOCAINE AND MENTHOL 1 LOZENGE: 5; 1 LIQUID ORAL at 23:30

## 2024-05-08 RX ADMIN — Medication 0.25 MILLIGRAM(S): at 21:36

## 2024-05-08 RX ADMIN — ATORVASTATIN CALCIUM 20 MILLIGRAM(S): 80 TABLET, FILM COATED ORAL at 21:36

## 2024-05-08 RX ADMIN — Medication 650 MILLIGRAM(S): at 22:34

## 2024-05-08 RX ADMIN — Medication 650 MILLIGRAM(S): at 21:34

## 2024-05-08 NOTE — ASU PATIENT PROFILE, ADULT - FALL HARM RISK - HARM RISK INTERVENTIONS

## 2024-05-08 NOTE — DISCHARGE NOTE PROVIDER - CARE PROVIDER_API CALL
Ricki Moreno  Cardiac Electrophysiology  [address] [place] [address], [address]  [place] [address]  Phone: [phone]  Fax: [phone]  Follow Up Time:

## 2024-05-08 NOTE — H&P PST ADULT - HISTORY OF PRESENT ILLNESS
Pt is a 74 y/o HTN, HLD, GERD, OA s/p bilateral TKR, BILL and persistent AF (s/p AF ablation 23 Josh), who presents today for an elective redo AF ablation with Dr. Moreno. Pt did well following her initial ablation until April when she had back pain and felt palpitations and was noted to be back in atrial fibrillation.     Cardiology Summary:  EK24 AF rate controlled with non-specific ST-T wave changes  TTE: 23: LVEF 65%.   Cath: 5/10/22: mLAD 40% stenosis with a myocardial bridge and severely tortuous coronaries. Luminal irregularities        Pt is a 72 y/o HTN, HLD, GERD, OA s/p bilateral TKR, BILL and persistent AF (s/p AF ablation and slow pathway modification 23 Josh), who presents today for an elective redo AF ablation with Dr. Moreno. Pt did well following her initial ablation until April when she had back pain and felt palpitations and was noted to be back in atrial fibrillation. Pt underwent DCCV on 24, but unfortunately once again had a recurrence of AF. Pt      Cardiology Summary:  EK24 AF rate controlled with non-specific ST-T wave changes  TTE: 23: LVEF 65%.   Cath: 5/10/22: mLAD 40% stenosis with a myocardial bridge and severely tortuous coronaries. Luminal irregularities        Pt is a 74 y/o HTN, HLD, GERD, OA s/p bilateral TKR, BILL and persistent AF (s/p AF ablation and slow pathway modification 23 Josh), who presents today for an elective redo AF ablation with Dr. Moreno. Pt did well following her initial ablation until April when she had back pain and felt palpitations and was noted to be back in atrial fibrillation. Pt underwent DCCV on 24, but unfortunately once again had a recurrence of AF. Pt reports strict compliance with Eliquis (last dose yesterday evening). Currently w/o complaints at time of arrival today. Denies chest pain, SOB, palpitations, dizziness, fever, chills. SUNI will be performed prior to ablation to clear the RADHA of thrombus.    Cardiology Summary:  EK24 AF rate controlled with non-specific ST-T wave changes  TTE: 23: LVEF 65%.   Cath: 5/10/22: mLAD 40% stenosis with a myocardial bridge and severely tortuous coronaries. Luminal irregularities

## 2024-05-08 NOTE — DISCHARGE NOTE PROVIDER - NSDCFUADDINST_GEN_ALL_CORE_FT
Follow up with Dr. Moreno in 2 - 4 weeks.  Follow up with Dr. Moreno in 2 - 4 weeks. Our office will contact you in 3-5 days to schedule this appointment. Please call [phone] with questions or concerns.  Hold hydrochlorothiazide while taking Furosemide for 2 days  Start Multaq 400mg every 12 hours

## 2024-05-08 NOTE — PROGRESS NOTE ADULT - SUBJECTIVE AND OBJECTIVE BOX
PROCEDURE: Radiofrequency Ablation of Atrial Fibrillation    ELECTROPHYSIOLOGIST: Ricki Moreno MD         COMPLICATIONS:  none        DISPOSITION: observation      CONDITION: stable      Pt doing well s/p elective radiofrequency atrial fibrillation ablation (CTI line, SVC, and posterior wall isolation), access obtained via b/l femoral veins, hemostasis achieved with b/l groin vascades. Pt denies complaint post procedure.     MEDICATIONS  (STANDING):  amLODIPine   Tablet 5 milliGRAM(s) Oral daily  apixaban 5 milliGRAM(s) Oral <User Schedule>  atorvastatin 20 milliGRAM(s) Oral at bedtime  dronedarone 400 milliGRAM(s) Oral two times a day  hydrochlorothiazide 12.5 milliGRAM(s) Oral daily  losartan 100 milliGRAM(s) Oral daily  pantoprazole    Tablet 40 milliGRAM(s) Oral every 12 hours  sucralfate suspension 1 Gram(s) Oral every 12 hours    MEDICATIONS  (PRN):  acetaminophen     Tablet .. 650 milliGRAM(s) Oral every 6 hours PRN Moderate Pain (4 - 6)  ALPRAZolam 0.25 milliGRAM(s) Oral every 8 hours PRN Anxiety / insomnia  aluminum hydroxide/magnesium hydroxide/simethicone Suspension 30 milliLiter(s) Oral every 4 hours PRN Dyspepsia  benzocaine/menthol Lozenge 1 Lozenge Oral every 2 hours PRN Sore Throat  ondansetron Injectable 4 milliGRAM(s) IV Push every 6 hours PRN Nausea and/or Vomiting      Allergies    DilTIAZem (Eqv-Cardizem CD) (Swelling; Nausea)  Metoprolol Succinate ER (Other)  Percocet 10/325 (Nausea)    Intolerances          VS:  T(C): 36.6 (05-08-24 @ 09:17), Max: 36.6 (05-08-24 @ 08:04)  HR: 65 (05-08-24 @ 18:30) (65 - 79)  BP: 118/72 (05-08-24 @ 18:30) (102/81 - 118/72)  RR: 15 (05-08-24 @ 18:30) (15 - 19)  SpO2: 93% (05-08-24 @ 18:30) (93% - 100%)  Wt(kg): --        Post procedure VS:  HR: 63  BP: 99/78  RR: 18  SpO2: 99      Exam:  General: NAD, A&O x 3  Card: S1/S2, RRR, no m/g/r  Resp: lungs CTA b/l  Abd: S/NT/ND  Groins: hemostatic sutures in place; sites C/D/I; no bleeding, hematoma, erythema, exudate or edema  Ext: no edema; distal pulses intact    I/O's    Input: 2029    SUNI: No RADHA thrombus.    ECG: Pending    Assessment:    74 y/o HTN, HLD, GERD, OA s/p bilateral TKR, BILL and persistent AF (s/p AF ablation and slow pathway modification 7/7/23 Josh), who presents today for an elective redo AF ablation with Dr. Moreno. Pt did well following her initial ablation until April when she had back pain and felt palpitations and was noted to be back in atrial fibrillation.      Pt presented electively today 5/8/24 for and is now s/p elective radiofrequency atrial fibrillation ablation (CTI line, SVC, and posterior wall isolation), access obtained via b/l femoral veins, hemostasis achieved with b/l groin vascades. Pt denies complaint post procedure.       Plan:   Bedrest x 2 hours, then OOB with assistance and progress as tolerated.   Start Protonix 40mg BID x 2 weeks then daily X 6 weeks.     Start Carafate 1gm BID x 2 weeks.   Lasix 40mg IVP x 1 2100, then 20mg PO QD x 3 days  DO NOT HOLD, INTERRUPT OR REVERSE ANTICOAGULATION WITHOUT EXPLICIT APPROVAL FROM EP SERVICE  Start Multaq 400mg Q12HR   Continue other home medications.   Strict I/Os.  Please encourage incentive spirometry and ambulation once able.  Observation and monitoring on telemetry overnight with anticipated discharge in the AM and outpt follow up in 1 month.   PROCEDURE: Radiofrequency Ablation of Atrial Fibrillation    ELECTROPHYSIOLOGIST: Ricki Moreno MD         COMPLICATIONS:  none        DISPOSITION: observation      CONDITION: stable      Pt doing well s/p elective radiofrequency atrial fibrillation ablation (CTI line, SVC, and posterior wall isolation), access obtained via b/l femoral veins, hemostasis achieved with b/l groin vascades. Pt denies complaint post procedure.     MEDICATIONS  (STANDING):  amLODIPine   Tablet 5 milliGRAM(s) Oral daily  apixaban 5 milliGRAM(s) Oral <User Schedule>  atorvastatin 20 milliGRAM(s) Oral at bedtime  dronedarone 400 milliGRAM(s) Oral two times a day  hydrochlorothiazide 12.5 milliGRAM(s) Oral daily  losartan 100 milliGRAM(s) Oral daily  pantoprazole    Tablet 40 milliGRAM(s) Oral every 12 hours  sucralfate suspension 1 Gram(s) Oral every 12 hours    MEDICATIONS  (PRN):  acetaminophen     Tablet .. 650 milliGRAM(s) Oral every 6 hours PRN Moderate Pain (4 - 6)  ALPRAZolam 0.25 milliGRAM(s) Oral every 8 hours PRN Anxiety / insomnia  aluminum hydroxide/magnesium hydroxide/simethicone Suspension 30 milliLiter(s) Oral every 4 hours PRN Dyspepsia  benzocaine/menthol Lozenge 1 Lozenge Oral every 2 hours PRN Sore Throat  ondansetron Injectable 4 milliGRAM(s) IV Push every 6 hours PRN Nausea and/or Vomiting      Allergies    DilTIAZem (Eqv-Cardizem CD) (Swelling; Nausea)  Metoprolol Succinate ER (Other)  Percocet 10/325 (Nausea)    Intolerances          VS:  T(C): 36.6 (05-08-24 @ 09:17), Max: 36.6 (05-08-24 @ 08:04)  HR: 65 (05-08-24 @ 18:30) (65 - 79)  BP: 118/72 (05-08-24 @ 18:30) (102/81 - 118/72)  RR: 15 (05-08-24 @ 18:30) (15 - 19)  SpO2: 93% (05-08-24 @ 18:30) (93% - 100%)  Wt(kg): --        Post procedure VS:  HR: 63  BP: 99/78  RR: 18  SpO2: 99      Exam:  General: NAD, A&O x 3  Card: S1/S2, RRR, no m/g/r  Resp: lungs CTA b/l  Abd: S/NT/ND  Groins: hemostatic sutures in place; sites C/D/I; no bleeding, hematoma, erythema, exudate or edema  Ext: no edema; distal pulses intact    I/O's    Input: 2029    SUNI: No RADHA thrombus.    ECG: Pending    Assessment:    74 y/o HTN, HLD, GERD, OA s/p bilateral TKR, BILL and persistent AF (s/p AF ablation and slow pathway modification 7/7/23 Josh), who presents today for an elective redo AF ablation with Dr. Moreno. Pt did well following her initial ablation until April when she had back pain and felt palpitations and was noted to be back in atrial fibrillation.      Pt presented electively today 5/8/24 for and is now s/p elective radiofrequency atrial fibrillation ablation (CTI line, SVC, and posterior wall isolation), access obtained via b/l femoral veins, hemostasis achieved with b/l groin vascades. Pt denies complaint post procedure.       Plan:   Bedrest x 2 hours, then OOB with assistance and progress as tolerated.   Start Protonix 40mg BID x 2 weeks then daily X 6 weeks.     Start Carafate 1gm BID x 2 weeks.   Lasix 40mg IVP x 1 2100, then 20mg PO QD x 3 days  DO NOT HOLD, INTERRUPT OR REVERSE ANTICOAGULATION WITHOUT EXPLICIT APPROVAL FROM EP SERVICE  Start Multaq 400mg Q12HR   Resume Eliquis 5mg Q12HR @ 2200  Continue other home medications.   Strict I/Os.  Please encourage incentive spirometry and ambulation once able.  Observation and monitoring on telemetry overnight with anticipated discharge in the AM and outpt follow up in 1 month.

## 2024-05-08 NOTE — H&P PST ADULT - ASSESSMENT
Pt is a 72 y/o HTN, HLD, GERD, OA s/p bilateral TKR, BILL and persistent AF (s/p AF ablation and slow pathway modification 7/7/23 Josh), who presents today for an elective redo AF ablation with Dr. Moreno. Pt did well following her initial ablation until April when she had back pain and felt palpitations and was noted to be back in atrial fibrillation. Pt underwent DCCV on 2/12/24, but unfortunately once again had a recurrence of AF. Pt reports strict compliance with Eliquis (last dose yesterday evening). Currently w/o complaints at time of arrival today. Denies chest pain, SOB, palpitations, dizziness, fever, chills. SUNI will be performed prior to ablation to clear the RADHA of thrombus.    Plan:  1) SUNI / AF ablation  - Labs / EKG  - Consent with attending  - Maintain NPO status pending procedure  - Site prep per protocol

## 2024-05-08 NOTE — DISCHARGE NOTE PROVIDER - NSDCMRMEDTOKEN_GEN_ALL_CORE_FT
amLODIPine 5 mg oral tablet: 1 orally once a day  atorvastatin 20 mg oral tablet: 1 orally once a day  Eliquis 5 mg oral tablet: 1 orally 2 times a day  hydroCHLOROthiazide 12.5 mg oral tablet: 1 tab(s) orally once a day  Multiple Vitamins oral tablet: 1 tab(s) orally once a day  nebivolol 10 mg oral tablet: 1 orally once a day  olmesartan 40 mg oral tablet: 1 orally once a day  pantoprazole 40 mg oral delayed release tablet: 1 tab(s) orally once a day  Vitamin C 500 mg oral capsule: 1 cap(s) orally  Vitamin D3 50 mcg (2000 intl units) oral capsule: 1 cap(s) orally once a day   amLODIPine 5 mg oral tablet: 1 orally once a day  atorvastatin 20 mg oral tablet: 1 orally once a day  dronedarone 400 mg oral tablet: 1 tab(s) orally 2 times a day  Eliquis 5 mg oral tablet: 1 orally 2 times a day  furosemide 20 mg oral tablet: 1 tab(s) orally once a day x 2 days  hydroCHLOROthiazide 12.5 mg oral tablet: 1 tab(s) orally once a day  Multiple Vitamins oral tablet: 1 tab(s) orally once a day  nebivolol 10 mg oral tablet: 1 orally once a day  olmesartan 40 mg oral tablet: 1 orally once a day  pantoprazole 40 mg oral delayed release tablet: 1 tab(s) orally once a day (Take 1 tablet twice a day for 14 days, then one tablet once a day for 6 weeks)  sucralfate 1 g/10 mL oral suspension: 10 milliliter(s) orally every 12 hours  Vitamin C 500 mg oral capsule: 1 cap(s) orally  Vitamin D3 50 mcg (2000 intl units) oral capsule: 1 cap(s) orally once a day

## 2024-05-08 NOTE — DISCHARGE NOTE PROVIDER - HOSPITAL COURSE
72 y/o HTN, HLD, GERD, OA s/p bilateral TKR, BILL and persistent AF (s/p AF ablation and slow pathway modification 7/7/23 Josh), who presents today for an elective redo AF ablation with Dr. Moerno. Pt did well following her initial ablation until April when she had back pain and felt palpitations and was noted to be back in atrial fibrillation. Pt presented electively 5/8/24 for and is now s/p elective radiofrequency atrial fibrillation ablation (CTI line, SVC, and posterior wall isolation), access obtained via b/l femoral veins, hemostasis achieved with b/l groin vascades. Pt denies complaint post procedure.

## 2024-05-09 ENCOUNTER — TRANSCRIPTION ENCOUNTER (OUTPATIENT)
Age: 74
End: 2024-05-09

## 2024-05-09 VITALS
DIASTOLIC BLOOD PRESSURE: 66 MMHG | RESPIRATION RATE: 18 BRPM | HEART RATE: 64 BPM | TEMPERATURE: 98 F | OXYGEN SATURATION: 96 % | SYSTOLIC BLOOD PRESSURE: 114 MMHG

## 2024-05-09 LAB
ANION GAP SERPL CALC-SCNC: 10 MMOL/L — SIGNIFICANT CHANGE UP (ref 5–17)
BUN SERPL-MCNC: 23.6 MG/DL — HIGH (ref 8–20)
CALCIUM SERPL-MCNC: 8.8 MG/DL — SIGNIFICANT CHANGE UP (ref 8.4–10.5)
CHLORIDE SERPL-SCNC: 106 MMOL/L — SIGNIFICANT CHANGE UP (ref 96–108)
CO2 SERPL-SCNC: 24 MMOL/L — SIGNIFICANT CHANGE UP (ref 22–29)
CREAT SERPL-MCNC: 0.8 MG/DL — SIGNIFICANT CHANGE UP (ref 0.5–1.3)
EGFR: 78 ML/MIN/1.73M2 — SIGNIFICANT CHANGE UP
GLUCOSE SERPL-MCNC: 136 MG/DL — HIGH (ref 70–99)
HCT VFR BLD CALC: 36.3 % — SIGNIFICANT CHANGE UP (ref 34.5–45)
HGB BLD-MCNC: 11.8 G/DL — SIGNIFICANT CHANGE UP (ref 11.5–15.5)
MAGNESIUM SERPL-MCNC: 2 MG/DL — SIGNIFICANT CHANGE UP (ref 1.6–2.6)
MCHC RBC-ENTMCNC: 29 PG — SIGNIFICANT CHANGE UP (ref 27–34)
MCHC RBC-ENTMCNC: 32.5 GM/DL — SIGNIFICANT CHANGE UP (ref 32–36)
MCV RBC AUTO: 89.2 FL — SIGNIFICANT CHANGE UP (ref 80–100)
PLATELET # BLD AUTO: 205 K/UL — SIGNIFICANT CHANGE UP (ref 150–400)
POTASSIUM SERPL-MCNC: 4.1 MMOL/L — SIGNIFICANT CHANGE UP (ref 3.5–5.3)
POTASSIUM SERPL-SCNC: 4.1 MMOL/L — SIGNIFICANT CHANGE UP (ref 3.5–5.3)
RBC # BLD: 4.07 M/UL — SIGNIFICANT CHANGE UP (ref 3.8–5.2)
RBC # FLD: 13.7 % — SIGNIFICANT CHANGE UP (ref 10.3–14.5)
SODIUM SERPL-SCNC: 140 MMOL/L — SIGNIFICANT CHANGE UP (ref 135–145)
WBC # BLD: 6.98 K/UL — SIGNIFICANT CHANGE UP (ref 3.8–10.5)
WBC # FLD AUTO: 6.98 K/UL — SIGNIFICANT CHANGE UP (ref 3.8–10.5)

## 2024-05-09 PROCEDURE — C1732: CPT

## 2024-05-09 PROCEDURE — 93657 TX L/R ATRIAL FIB ADDL: CPT

## 2024-05-09 PROCEDURE — 93312 ECHO TRANSESOPHAGEAL: CPT

## 2024-05-09 PROCEDURE — C9399: CPT

## 2024-05-09 PROCEDURE — 85730 THROMBOPLASTIN TIME PARTIAL: CPT

## 2024-05-09 PROCEDURE — 86900 BLOOD TYPING SEROLOGIC ABO: CPT

## 2024-05-09 PROCEDURE — C1889: CPT

## 2024-05-09 PROCEDURE — 93656 COMPRE EP EVAL ABLTJ ATR FIB: CPT

## 2024-05-09 PROCEDURE — 85027 COMPLETE CBC AUTOMATED: CPT

## 2024-05-09 PROCEDURE — 99232 SBSQ HOSP IP/OBS MODERATE 35: CPT

## 2024-05-09 PROCEDURE — 93005 ELECTROCARDIOGRAM TRACING: CPT

## 2024-05-09 PROCEDURE — 36415 COLL VENOUS BLD VENIPUNCTURE: CPT

## 2024-05-09 PROCEDURE — 83735 ASSAY OF MAGNESIUM: CPT

## 2024-05-09 PROCEDURE — C1894: CPT

## 2024-05-09 PROCEDURE — C1731: CPT

## 2024-05-09 PROCEDURE — 86901 BLOOD TYPING SEROLOGIC RH(D): CPT

## 2024-05-09 PROCEDURE — 85610 PROTHROMBIN TIME: CPT

## 2024-05-09 PROCEDURE — C1759: CPT

## 2024-05-09 PROCEDURE — 80048 BASIC METABOLIC PNL TOTAL CA: CPT

## 2024-05-09 PROCEDURE — 93655 ICAR CATH ABLTJ DSCRT ARRHYT: CPT

## 2024-05-09 PROCEDURE — 86850 RBC ANTIBODY SCREEN: CPT

## 2024-05-09 PROCEDURE — 93320 DOPPLER ECHO COMPLETE: CPT

## 2024-05-09 PROCEDURE — 93325 DOPPLER ECHO COLOR FLOW MAPG: CPT

## 2024-05-09 PROCEDURE — 93010 ELECTROCARDIOGRAM REPORT: CPT

## 2024-05-09 PROCEDURE — C1760: CPT

## 2024-05-09 RX ORDER — PANTOPRAZOLE SODIUM 20 MG/1
1 TABLET, DELAYED RELEASE ORAL
Qty: 70 | Refills: 0
Start: 2024-05-09 | End: 2024-05-22

## 2024-05-09 RX ORDER — DRONEDARONE 400 MG/1
1 TABLET, FILM COATED ORAL
Qty: 60 | Refills: 0
Start: 2024-05-09 | End: 2024-06-07

## 2024-05-09 RX ORDER — SUCRALFATE 1 G
10 TABLET ORAL
Qty: 280 | Refills: 0
Start: 2024-05-09 | End: 2024-05-22

## 2024-05-09 RX ORDER — FUROSEMIDE 40 MG
1 TABLET ORAL
Qty: 2 | Refills: 0
Start: 2024-05-09 | End: 2024-05-10

## 2024-05-09 RX ADMIN — PANTOPRAZOLE SODIUM 40 MILLIGRAM(S): 20 TABLET, DELAYED RELEASE ORAL at 05:35

## 2024-05-09 RX ADMIN — AMLODIPINE BESYLATE 5 MILLIGRAM(S): 2.5 TABLET ORAL at 05:33

## 2024-05-09 RX ADMIN — Medication 20 MILLIGRAM(S): at 05:35

## 2024-05-09 RX ADMIN — DRONEDARONE 400 MILLIGRAM(S): 400 TABLET, FILM COATED ORAL at 05:35

## 2024-05-09 RX ADMIN — NEBIVOLOL HYDROCHLORIDE 10 MILLIGRAM(S): 5 TABLET ORAL at 05:34

## 2024-05-09 RX ADMIN — LOSARTAN POTASSIUM 100 MILLIGRAM(S): 100 TABLET, FILM COATED ORAL at 05:37

## 2024-05-09 RX ADMIN — Medication 1 GRAM(S): at 05:35

## 2024-05-09 RX ADMIN — BENZOCAINE AND MENTHOL 1 LOZENGE: 5; 1 LIQUID ORAL at 05:35

## 2024-05-09 RX ADMIN — APIXABAN 5 MILLIGRAM(S): 2.5 TABLET, FILM COATED ORAL at 09:18

## 2024-05-09 NOTE — DISCHARGE NOTE NURSING/CASE MANAGEMENT/SOCIAL WORK - PATIENT PORTAL LINK FT
You can access the FollowMyHealth Patient Portal offered by [facility] by registering at the following website: [email or web address]. By joining [employer]’s FollowMyHealth portal, you will also be able to view your health information using other applications (apps) compatible with our system.

## 2024-05-09 NOTE — PROGRESS NOTE ADULT - SUBJECTIVE AND OBJECTIVE BOX
Pt doing well POD #1 s/p elective radiofrequency atrial fibrillation ablation (CTI line, SVC, and posterior wall isolation). Denies any complaints at time of assessment this AM.     EKG: Sinus rhythm with 1st degree AVB  TELE: SR with intact conduction.     MEDICATIONS  (STANDING):  amLODIPine   Tablet 5 milliGRAM(s) Oral daily  apixaban 5 milliGRAM(s) Oral <User Schedule>  atorvastatin 20 milliGRAM(s) Oral at bedtime  dronedarone 400 milliGRAM(s) Oral two times a day  furosemide    Tablet 20 milliGRAM(s) Oral daily  hydrochlorothiazide 12.5 milliGRAM(s) Oral daily  losartan 100 milliGRAM(s) Oral daily  nebivolol 10 milliGRAM(s) Oral daily  pantoprazole    Tablet 40 milliGRAM(s) Oral every 12 hours  sucralfate suspension 1 Gram(s) Oral every 12 hours    MEDICATIONS  (PRN):  acetaminophen     Tablet .. 650 milliGRAM(s) Oral every 6 hours PRN Moderate Pain (4 - 6)  ALPRAZolam 0.25 milliGRAM(s) Oral every 8 hours PRN Anxiety / insomnia  aluminum hydroxide/magnesium hydroxide/simethicone Suspension 30 milliLiter(s) Oral every 4 hours PRN Dyspepsia  benzocaine/menthol Lozenge 1 Lozenge Oral every 2 hours PRN Sore Throat  ondansetron Injectable 4 milliGRAM(s) IV Push every 6 hours PRN Nausea and/or Vomiting      Allergies    DilTIAZem (Eqv-Cardizem CD) (Swelling; Nausea)  Metoprolol Succinate ER (Other)  Percocet 10/325 (Nausea)    Intolerances      PAST MEDICAL & SURGICAL HISTORY:  HTN (Hypertension)      GERD (Gastroesophageal Reflux Disease)      Herniated Disc- cervical      Osteoarthrosis, Localized, Primary, Involving Lower Leg      Herniated Disc- lumbar      HLD (hyperlipidemia)      Atrial fibrillation      S/P Arthroscopy of Right Knee      History of Total Hysterectomy with Removal of Both Tubes and Ovaries      S/P Carpal Tunnel Release  right      S/P Carpal Tunnel Release  left      History of Breast Lump/Mass Excision  right      Trigger finger release- left      Herniated Disc- lumbar      S/P Shoulder Surgery        Vital Signs Last 24 Hrs  T(C): 36.5 (09 May 2024 05:30), Max: 36.6 (08 May 2024 08:04)  T(F): 97.7 (09 May 2024 05:30), Max: 97.9 (08 May 2024 08:04)  HR: 62 (09 May 2024 05:30) (58 - 79)  BP: 115/74 (09 May 2024 05:30) (89/70 - 118/72)  BP(mean): 88 (08 May 2024 20:35) (88 - 88)  RR: 18 (09 May 2024 05:30) (15 - 19)  SpO2: 100% (09 May 2024 05:30) (93% - 100%)    Parameters below as of 09 May 2024 05:30  Patient On (Oxygen Delivery Method): room air        Physical Exam:  Constitutional: NAD, AAOx3  Cardiovascular: +S1S2 RRR  Pulmonary: CTA b/l, unlabored  Abd: soft NTND +BS  Groins: C/D/I bilaterally; no bleeding, hematoma, edema  Extremities: no pedal edema, +distal pulses b/l  Neuro: non focal, JACKSON x4    LABS:                        11.8   6.98  )-----------( 205      ( 09 May 2024 05:37 )             36.3     05-09    140  |  106  |  23.6<H>  ----------------------------<  136<H>  4.1   |  24.0  |  0.80    Ca    8.8      09 May 2024 05:37  Mg     2.0     05-09      PT/INR - ( 08 May 2024 09:02 )   PT: 13.6 sec;   INR: 1.23 ratio         PTT - ( 08 May 2024 09:02 )  PTT:36.7 sec  Urinalysis Basic - ( 09 May 2024 05:37 )    Color: x / Appearance: x / SG: x / pH: x  Gluc: 136 mg/dL / Ketone: x  / Bili: x / Urobili: x   Blood: x / Protein: x / Nitrite: x   Leuk Esterase: x / RBC: x / WBC x   Sq Epi: x / Non Sq Epi: x / Bacteria: x        Assessment:   72 y/o HTN, HLD, GERD, OA s/p bilateral TKR, BILL and persistent AF (s/p AF ablation and slow pathway modification 7/7/23 Josh), who presents today for an elective redo AF ablation with Dr. Moreno. Pt did well following her initial ablation until April when she had back pain and felt palpitations and was noted to be back in atrial fibrillation.      Pt is now POD #1 s/p atrial fibrillation ablation (CTI line, SVC, and posterior wall isolation), access obtained via b/l femoral veins, hemostasis achieved with b/l groin vascades. Pt denies complaints at time of assessment this AM.     Plan:   Start Protonix 40mg BID x 2 weeks then daily X 6 weeks.     Start Carafate 1gm BID x 2 weeks.   Lasix 20mg PO QD x 3 days  Start Multaq 400mg Q12HR   c/w Eliquis 5mg Q12HR @ 2200  Pt instructed as activity limitations - no lifting/pushing/pulling >10 lbs or strenuous exercise x 1 week.   Pt instructed as to access site care and f/up - written instructions included in d/c documents.  Outpt f/up in 2-4 weeks - office will contact pt to schedule.

## 2024-05-09 NOTE — DISCHARGE NOTE NURSING/CASE MANAGEMENT/SOCIAL WORK - NSDCPEFALRISK_GEN_ALL_CORE
For information on Fall & Injury Prevention, visit: [email or web address] OR  [email or web address] OR  https://www.cdc.gov/steadi/patient.html

## 2024-09-27 ENCOUNTER — RX RENEWAL (OUTPATIENT)
Age: 74
End: 2024-09-27

## 2024-12-10 ENCOUNTER — APPOINTMENT (OUTPATIENT)
Dept: ORTHOPEDIC SURGERY | Facility: CLINIC | Age: 74
End: 2024-12-10
Payer: MEDICARE

## 2024-12-10 VITALS — HEIGHT: 66 IN | WEIGHT: 230 LBS | BODY MASS INDEX: 36.96 KG/M2

## 2024-12-10 DIAGNOSIS — Z78.9 OTHER SPECIFIED HEALTH STATUS: ICD-10-CM

## 2024-12-10 DIAGNOSIS — M19.071 PRIMARY OSTEOARTHRITIS, RIGHT ANKLE AND FOOT: ICD-10-CM

## 2024-12-10 DIAGNOSIS — M19.072 PRIMARY OSTEOARTHRITIS, RIGHT ANKLE AND FOOT: ICD-10-CM

## 2024-12-10 DIAGNOSIS — M25.871 OTHER SPECIFIED JOINT DISORDERS, RIGHT ANKLE AND FOOT: ICD-10-CM

## 2024-12-10 DIAGNOSIS — M25.571 PAIN IN RIGHT ANKLE AND JOINTS OF RIGHT FOOT: ICD-10-CM

## 2024-12-10 DIAGNOSIS — M25.371 OTHER INSTABILITY, RIGHT ANKLE: ICD-10-CM

## 2024-12-10 DIAGNOSIS — M76.71 PERONEAL TENDINITIS, RIGHT LEG: ICD-10-CM

## 2024-12-10 DIAGNOSIS — M25.572 PAIN IN RIGHT ANKLE AND JOINTS OF RIGHT FOOT: ICD-10-CM

## 2024-12-10 PROCEDURE — 73610 X-RAY EXAM OF ANKLE: CPT | Mod: 50

## 2024-12-10 PROCEDURE — 99213 OFFICE O/P EST LOW 20 MIN: CPT
